# Patient Record
Sex: MALE | Race: WHITE | NOT HISPANIC OR LATINO | Employment: OTHER | URBAN - METROPOLITAN AREA
[De-identification: names, ages, dates, MRNs, and addresses within clinical notes are randomized per-mention and may not be internally consistent; named-entity substitution may affect disease eponyms.]

---

## 2021-10-15 ENCOUNTER — OFFICE VISIT (OUTPATIENT)
Dept: PODIATRY | Facility: CLINIC | Age: 81
End: 2021-10-15
Payer: MEDICARE

## 2021-10-15 VITALS
RESPIRATION RATE: 18 BRPM | SYSTOLIC BLOOD PRESSURE: 150 MMHG | HEIGHT: 70 IN | BODY MASS INDEX: 37.22 KG/M2 | DIASTOLIC BLOOD PRESSURE: 68 MMHG | HEART RATE: 63 BPM | WEIGHT: 260 LBS

## 2021-10-15 DIAGNOSIS — R60.0 EDEMA OF BOTH LOWER LEGS: ICD-10-CM

## 2021-10-15 DIAGNOSIS — E11.40 TYPE 2 DIABETES, CONTROLLED, WITH NEUROPATHY: Primary | ICD-10-CM

## 2021-10-15 DIAGNOSIS — L60.0 INCURVED TOENAIL: ICD-10-CM

## 2021-10-15 DIAGNOSIS — Z74.09 LIMITED MOBILITY: ICD-10-CM

## 2021-10-15 DIAGNOSIS — M19.90 ARTHRITIS: ICD-10-CM

## 2021-10-15 PROCEDURE — 99203 OFFICE O/P NEW LOW 30 MIN: CPT | Mod: S$PBB,,, | Performed by: PODIATRIST

## 2021-10-15 PROCEDURE — 99203 PR OFFICE/OUTPT VISIT, NEW, LEVL III, 30-44 MIN: ICD-10-PCS | Mod: S$PBB,,, | Performed by: PODIATRIST

## 2021-10-15 PROCEDURE — 99999 PR PBB SHADOW E&M-NEW PATIENT-LVL V: ICD-10-PCS | Mod: PBBFAC,,, | Performed by: PODIATRIST

## 2021-10-15 PROCEDURE — 99999 PR PBB SHADOW E&M-NEW PATIENT-LVL V: CPT | Mod: PBBFAC,,, | Performed by: PODIATRIST

## 2021-10-15 PROCEDURE — 99205 OFFICE O/P NEW HI 60 MIN: CPT | Mod: PBBFAC | Performed by: PODIATRIST

## 2021-10-15 RX ORDER — MULTIVITAMIN
1 TABLET ORAL DAILY
COMMUNITY
End: 2021-10-18

## 2021-10-15 RX ORDER — IPRATROPIUM BROMIDE 0.5 MG/2.5ML
500 SOLUTION RESPIRATORY (INHALATION) 4 TIMES DAILY
COMMUNITY

## 2021-10-15 RX ORDER — MELOXICAM 15 MG/1
15 TABLET ORAL DAILY
COMMUNITY
Start: 2021-07-16 | End: 2021-10-18

## 2021-10-15 RX ORDER — HYDROCODONE BITARTRATE AND ACETAMINOPHEN 7.5; 325 MG/1; MG/1
TABLET ORAL
COMMUNITY
Start: 2021-09-22 | End: 2021-10-18

## 2021-10-15 RX ORDER — SILODOSIN 8 MG/1
8 CAPSULE ORAL
COMMUNITY
Start: 2021-03-15 | End: 2022-07-31

## 2021-10-15 RX ORDER — SERTRALINE HYDROCHLORIDE 100 MG/1
100 TABLET, FILM COATED ORAL DAILY
COMMUNITY
Start: 2021-10-08 | End: 2021-10-18 | Stop reason: SDUPTHER

## 2021-10-15 RX ORDER — PROPRANOLOL HYDROCHLORIDE 80 MG/1
80 TABLET ORAL
COMMUNITY
End: 2021-10-18

## 2021-10-15 RX ORDER — CARBIDOPA AND LEVODOPA 25; 100 MG/1; MG/1
TABLET ORAL
COMMUNITY
Start: 2021-05-26 | End: 2022-05-21

## 2021-10-15 RX ORDER — TIOTROPIUM BROMIDE AND OLODATEROL 3.124; 2.736 UG/1; UG/1
SPRAY, METERED RESPIRATORY (INHALATION)
COMMUNITY
Start: 2021-09-12

## 2021-10-15 RX ORDER — AMLODIPINE BESYLATE 5 MG/1
TABLET ORAL
COMMUNITY
Start: 2021-07-20

## 2021-10-15 RX ORDER — SILODOSIN 8 MG/1
8 CAPSULE ORAL DAILY
COMMUNITY
Start: 2021-09-12 | End: 2021-10-18

## 2021-10-15 RX ORDER — GLIMEPIRIDE 2 MG/1
2 TABLET ORAL DAILY
COMMUNITY
Start: 2021-10-14 | End: 2021-10-18

## 2021-10-15 RX ORDER — LEVOFLOXACIN 500 MG/1
500 TABLET, FILM COATED ORAL DAILY
COMMUNITY
Start: 2021-09-27 | End: 2021-10-18

## 2021-10-15 RX ORDER — GABAPENTIN 600 MG/1
600 TABLET ORAL
COMMUNITY
Start: 2021-08-03 | End: 2024-02-21 | Stop reason: SDUPTHER

## 2021-10-15 RX ORDER — IPRATROPIUM BROMIDE 21 UG/1
SPRAY, METERED NASAL
COMMUNITY
Start: 2021-09-12 | End: 2021-10-18

## 2021-10-15 RX ORDER — SACUBITRIL AND VALSARTAN 97; 103 MG/1; MG/1
TABLET, FILM COATED ORAL
COMMUNITY
Start: 2021-02-08 | End: 2021-10-18

## 2021-10-15 RX ORDER — SERTRALINE HYDROCHLORIDE 100 MG/1
TABLET, FILM COATED ORAL
COMMUNITY
Start: 2021-04-18

## 2021-10-15 RX ORDER — METHOCARBAMOL 750 MG/1
750 TABLET, FILM COATED ORAL NIGHTLY
COMMUNITY
Start: 2021-09-22

## 2021-10-15 RX ORDER — BUTALBITAL, ACETAMINOPHEN AND CAFFEINE 50; 325; 40 MG/1; MG/1; MG/1
TABLET ORAL
COMMUNITY
Start: 2021-08-13 | End: 2021-10-18 | Stop reason: SDUPTHER

## 2021-10-15 RX ORDER — FLUOCINOLONE ACETONIDE 0.11 MG/ML
OIL AURICULAR (OTIC) 2 TIMES DAILY
COMMUNITY
Start: 2021-06-09 | End: 2021-10-18

## 2021-10-15 RX ORDER — IPRATROPIUM BROMIDE 21 UG/1
2 SPRAY, METERED NASAL
COMMUNITY
Start: 2020-09-17 | End: 2021-10-18

## 2021-10-15 RX ORDER — DOXYCYCLINE 100 MG/1
100 CAPSULE ORAL 2 TIMES DAILY
COMMUNITY
Start: 2021-08-18 | End: 2021-10-18

## 2021-10-15 RX ORDER — METOPROLOL SUCCINATE 50 MG/1
TABLET, EXTENDED RELEASE ORAL
COMMUNITY
Start: 2021-09-30 | End: 2023-02-06 | Stop reason: ALTCHOICE

## 2021-10-15 RX ORDER — TIOTROPIUM BROMIDE AND OLODATEROL 3.124; 2.736 UG/1; UG/1
2 SPRAY, METERED RESPIRATORY (INHALATION)
COMMUNITY
Start: 2021-03-25 | End: 2021-10-18 | Stop reason: SDUPTHER

## 2021-10-15 RX ORDER — METHOCARBAMOL 500 MG/1
TABLET, FILM COATED ORAL
COMMUNITY
Start: 2021-05-21 | End: 2021-10-18

## 2021-10-15 RX ORDER — FINASTERIDE 5 MG/1
5 TABLET, FILM COATED ORAL DAILY
COMMUNITY
Start: 2021-10-08 | End: 2021-10-18

## 2021-10-15 RX ORDER — OMEPRAZOLE 40 MG/1
CAPSULE, DELAYED RELEASE ORAL
COMMUNITY
Start: 2021-05-10 | End: 2021-10-18

## 2021-10-15 RX ORDER — CARBIDOPA AND LEVODOPA 25; 100 MG/1; MG/1
1 TABLET ORAL NIGHTLY
COMMUNITY
Start: 2021-08-23 | End: 2021-10-18 | Stop reason: SDUPTHER

## 2021-10-15 RX ORDER — ATORVASTATIN CALCIUM 40 MG/1
40 TABLET, FILM COATED ORAL
COMMUNITY
End: 2021-10-18

## 2021-10-15 RX ORDER — BUTALBITAL, ACETAMINOPHEN AND CAFFEINE 50; 325; 40 MG/1; MG/1; MG/1
TABLET ORAL
COMMUNITY
Start: 2021-08-13 | End: 2022-12-05 | Stop reason: SDUPTHER

## 2021-10-15 RX ORDER — METHOCARBAMOL 750 MG/1
750 TABLET, FILM COATED ORAL
COMMUNITY
Start: 2021-09-22 | End: 2021-10-18 | Stop reason: SDUPTHER

## 2021-10-15 RX ORDER — RANOLAZINE 1000 MG/1
TABLET, EXTENDED RELEASE ORAL
COMMUNITY
Start: 2021-08-13 | End: 2021-10-18

## 2021-10-15 RX ORDER — METFORMIN HYDROCHLORIDE 850 MG/1
TABLET ORAL
COMMUNITY
Start: 2021-05-10

## 2021-10-15 RX ORDER — HYDROCODONE BITARTRATE AND ACETAMINOPHEN 7.5; 325 MG/1; MG/1
1 TABLET ORAL 3 TIMES DAILY PRN
COMMUNITY
Start: 2021-09-22 | End: 2021-10-18

## 2021-10-15 RX ORDER — NITROFURANTOIN 25; 75 MG/1; MG/1
100 CAPSULE ORAL 2 TIMES DAILY
COMMUNITY
Start: 2021-07-19 | End: 2021-10-18

## 2021-10-15 RX ORDER — PREDNISONE 1 MG/1
TABLET ORAL
COMMUNITY
Start: 2021-06-09 | End: 2021-10-18

## 2022-01-19 ENCOUNTER — OFFICE VISIT (OUTPATIENT)
Dept: PODIATRY | Facility: CLINIC | Age: 82
End: 2022-01-19
Payer: MEDICARE

## 2022-01-19 VITALS
SYSTOLIC BLOOD PRESSURE: 147 MMHG | DIASTOLIC BLOOD PRESSURE: 67 MMHG | BODY MASS INDEX: 37.22 KG/M2 | HEART RATE: 60 BPM | OXYGEN SATURATION: 97 % | WEIGHT: 260 LBS | HEIGHT: 70 IN

## 2022-01-19 DIAGNOSIS — R60.0 EDEMA OF BOTH LOWER LEGS: ICD-10-CM

## 2022-01-19 DIAGNOSIS — E11.40 TYPE 2 DIABETES, CONTROLLED, WITH NEUROPATHY: ICD-10-CM

## 2022-01-19 DIAGNOSIS — L60.0 INCURVED TOENAIL: ICD-10-CM

## 2022-01-19 DIAGNOSIS — E11.9 COMPREHENSIVE DIABETIC FOOT EXAMINATION, TYPE 2 DM, ENCOUNTER FOR: Primary | ICD-10-CM

## 2022-01-19 DIAGNOSIS — M19.90 ARTHRITIS: ICD-10-CM

## 2022-01-19 DIAGNOSIS — Z74.09 LIMITED MOBILITY: ICD-10-CM

## 2022-01-19 PROCEDURE — 99215 OFFICE O/P EST HI 40 MIN: CPT | Mod: PBBFAC,PN | Performed by: PODIATRIST

## 2022-01-19 PROCEDURE — 99214 PR OFFICE/OUTPT VISIT, EST, LEVL IV, 30-39 MIN: ICD-10-PCS | Mod: S$PBB,,, | Performed by: PODIATRIST

## 2022-01-19 PROCEDURE — 99999 PR PBB SHADOW E&M-EST. PATIENT-LVL V: CPT | Mod: PBBFAC,,, | Performed by: PODIATRIST

## 2022-01-19 PROCEDURE — 99214 OFFICE O/P EST MOD 30 MIN: CPT | Mod: S$PBB,,, | Performed by: PODIATRIST

## 2022-01-19 PROCEDURE — 99999 PR PBB SHADOW E&M-EST. PATIENT-LVL V: ICD-10-PCS | Mod: PBBFAC,,, | Performed by: PODIATRIST

## 2022-01-19 RX ORDER — RANOLAZINE 1000 MG/1
1000 TABLET, EXTENDED RELEASE ORAL 2 TIMES DAILY
COMMUNITY
Start: 2021-11-03

## 2022-01-19 RX ORDER — SACUBITRIL AND VALSARTAN 97; 103 MG/1; MG/1
1 TABLET, FILM COATED ORAL 2 TIMES DAILY
COMMUNITY
Start: 2022-01-03

## 2022-01-19 RX ORDER — PREDNISONE 1 MG/1
3 TABLET ORAL
COMMUNITY
Start: 2021-11-04 | End: 2022-04-23

## 2022-01-19 RX ORDER — FLUTICASONE PROPIONATE 50 MCG
SPRAY, SUSPENSION (ML) NASAL
COMMUNITY
Start: 2021-12-08

## 2022-01-19 RX ORDER — HYDROCODONE BITARTRATE AND ACETAMINOPHEN 7.5; 325 MG/1; MG/1
1 TABLET ORAL 3 TIMES DAILY PRN
COMMUNITY
Start: 2021-12-31

## 2022-01-19 RX ORDER — GLIMEPIRIDE 2 MG/1
2 TABLET ORAL DAILY
COMMUNITY
Start: 2022-01-13

## 2022-01-19 RX ORDER — OMEPRAZOLE 40 MG/1
40 CAPSULE, DELAYED RELEASE ORAL 2 TIMES DAILY
COMMUNITY
Start: 2021-12-07

## 2022-01-19 RX ORDER — FINASTERIDE 5 MG/1
5 TABLET, FILM COATED ORAL DAILY
COMMUNITY
Start: 2021-12-28

## 2022-01-19 RX ORDER — IPRATROPIUM BROMIDE 21 UG/1
SPRAY, METERED NASAL
COMMUNITY
Start: 2021-12-08

## 2022-01-22 NOTE — PROGRESS NOTES
Subjective:       Patient ID: Angel Duque Jr. is a 81 y.o. male.    Chief Complaint: Diabetic Foot Exam  Patient presents for annual diabetic foot exam chronic lower extremity edema, diminished pedal pulses. States nails feel ingrown. No swelling redness. Patient states he did eventually have arterial ultrasound performed through [facility] and when over results with his PCP which were consistent with age, no blockages.  He does confirm its chronic swelling in legs most likely does not help his circulation, does wear compression hose  Relates 10 year history diabetes,  well controlled, A1c 6.2, glucose 115 yesterday.  History of neuropathy on gabapentin.  Also has rheumatoid arthritis.        Past Medical History:   Diagnosis Date    AAA (abdominal aortic aneurysm)     Arthritis     Rheumatoid & Osteoarthritis    BPH (benign prostatic hyperplasia)     CAD (coronary artery disease)     Chronic bronchitis     COPD (chronic obstructive pulmonary disease)     Depression     Diabetes mellitus, type 2     Diverticulosis     DM (diabetes mellitus)     Dyslipidemia     GERD (gastroesophageal reflux disease)     Gout     Hernia of unspecified site of abdominal cavity without mention of obstruction or gangrene     HTN (hypertension)     Hx of CABG     Hyperlipidemia     Hypothyroidism     Insomnia     Ischemic cardiomyopathy     Lower back pain     Lymphedema     Macular degeneration     MI (myocardial infarction)     HORTENSIA (obstructive sleep apnea)     Peripheral neuropathy     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     Tremor      Past Surgical History:   Procedure Laterality Date    ABDOMINAL AORTIC ANEURYSM REPAIR      ABDOMINAL HERNIA REPAIR      CARPAL TUNNEL RELEASE Right     CORONARY ARTERY BYPASS GRAFT      hemrhoidectomy      HERNIA REPAIR      NASAL SEPTUM SURGERY      TONSILLECTOMY      TOTAL KNEE ARTHROPLASTY      TOTAL KNEE ARTHROPLASTY Bilateral      Family  History   Problem Relation Age of Onset    Esophageal cancer Paternal Uncle     Cancer Paternal Uncle 69        esophageal    Esophageal cancer Paternal Grandfather     Cancer Paternal Grandfather     Colon cancer Neg Hx      Social History     Socioeconomic History    Marital status:    Tobacco Use    Smoking status: Former Smoker     Quit date: 1997     Years since quittin.0    Smokeless tobacco: Never Used   Substance and Sexual Activity    Alcohol use: No    Drug use: No       Current Outpatient Medications   Medication Sig Dispense Refill    allopurinol (ZYLOPRIM) 300 MG tablet Take 300 mg by mouth once daily.       amLODIPine (NORVASC) 5 MG tablet       atorvastatin (LIPITOR) 80 MG tablet Take 80 mg by mouth every evening.       butalbital-acetaminophen-caffeine -40 mg (FIORICET, ESGIC) -40 mg per tablet   2 tab, Oral, q6h, PRN for headache, # 120 tab, 0 Refill(s), [facility]acy: [id number], 179, cm, 21 10:28:00 CDT, Height/Length Measured, 121.6, kg, 21 10:28:00 CDT, Weight Dosing      carbidopa-levodopa  mg (SINEMET)  mg per tablet   1 tab, Oral, HS, # 90 tab, 3 Refill(s), Pharmacy: [id number], 179, cm, 21 14:33:00 CDT, Height/Length Measured, 118.3, kg, 21 14:33:00 CDT, Weight Dosing      DICLOFENAC SODIUM (VOLTAREN TOP) Apply 1 % topically as needed (for pain in knees).      ENTRESTO  mg per tablet Take 1 tablet by mouth 2 (two) times daily.      finasteride (PROSCAR) 5 mg tablet Take 5 mg by mouth once daily.      fluticasone propionate (FLONASE) 50 mcg/actuation nasal spray by Each Nostril route.      gabapentin (NEURONTIN) 600 MG tablet 600 mg.      glimepiride (AMARYL) 2 MG tablet Take 2 mg by mouth once daily.      HYDROcodone-acetaminophen (NORCO) 7.5-325 mg per tablet Take 1 tablet by mouth 3 (three) times daily as needed.      ipratropium (ATROVENT) 0.02 % nebulizer solution Take 500 mcg by  nebulization 4 (four) times daily. Rescue      ipratropium (ATROVENT) 21 mcg (0.03 %) nasal spray SMARTSIG:Both Nares      levothyroxine (SYNTHROID) 88 MCG tablet Take 88 mcg by mouth before breakfast.       metFORMIN (GLUCOPHAGE) 850 MG tablet   = 1 tab, Oral, BID, *ZYDUS*., # 180 tab, 3 Refill(s), Maintenance, Pharmacy: [facility] 179, cm, 21 12:19:00 CDT, Height/Length Measured, 117.8, kg, 21 12:19:00 CDT, Weight Dosing      methocarbamoL (ROBAXIN) 750 MG Tab Take 750 mg by mouth nightly.      metoprolol succinate (TOPROL-XL) 50 MG 24 hr tablet       omeprazole (PRILOSEC) 40 MG capsule Take 40 mg by mouth 2 (two) times daily.      predniSONE (DELTASONE) 1 MG tablet 3 mg.      ranolazine (RANEXA) 1,000 mg Tb12 Take 1,000 mg by mouth 2 (two) times daily.      sertraline (ZOLOFT) 100 MG tablet   = 1 tab, Oral, Daily, # 90 tab, 3 Refill(s), Maintenance, Pharmacy: [facility], 179, cm, 21 14:52:00 CDT, Height/Length Measured, 117.02, kg, 21 14:52:00 CDT, Weight Dosing      silodosin (RAPAFLO) 8 mg Cap capsule 8 mg.      STIOLTO RESPIMAT 2.5-2.5 mcg/actuation Mist SMARTSI Puff(s) Via Inhaler Daily      trazodone (DESYREL) 150 MG tablet Take 150 mg by mouth every evening.       No current facility-administered medications for this visit.     Review of patient's allergies indicates:   Allergen Reactions    Pregabalin      Other reaction(s): Depression    Flomax [tamsulosin]      Made extremity weak    Roflumilast     Sulfa (sulfonamide antibiotics)     Sulfa dyne     Sulfur      Old type not new types of sulfur    Latex Itching    Tegretol [carbamazepine] Itching and Rash       Review of Systems   HENT: Negative for congestion.    Respiratory: Negative for cough and shortness of breath.    Cardiovascular: Positive for leg swelling.   Musculoskeletal: Positive for gait problem.   All other systems reviewed and are negative.     "  Objective:      Vitals:    01/19/22 1125   BP: (!) 147/67   Pulse: 60   SpO2: 97%   Weight: 117.9 kg (260 lb)   Height: 5' 10" (1.778 m)     Physical Exam  Vitals and nursing note reviewed.   Constitutional:       General: He is not in acute distress.  Cardiovascular:      Pulses:           Dorsalis pedis pulses are 1+ on the right side and 1+ on the left side.        Posterior tibial pulses are 1+ on the right side and 0 on the left side.   Pulmonary:      Effort: Pulmonary effort is normal.   Musculoskeletal:      Right foot: Decreased range of motion (Arthritic and degenerative changes bilateral feet limited range of motion 1st MPJ and midfoot bilateral).      Left foot: Decreased range of motion.   Feet:      Right foot:      Protective Sensation: 6 sites tested. 6 sites sensed.      Skin integrity: No skin breakdown or erythema.      Toenail Condition: Right toenails are long.      Left foot:      Protective Sensation: 6 sites tested. 6 sites sensed.      Skin integrity: No skin breakdown or erythema (incurvated nails, no erythema b/l hallux).      Toenail Condition: Left toenails are long.   Skin:     Capillary Refill: Capillary refill takes 2 to 3 seconds.   Neurological:      General: No focal deficit present.      Comments: Sensation intact all areas bilateral feet with monofilament, no painfil paresthesias feet   Psychiatric:         Mood and Affect: Mood normal.         Behavior: Behavior normal.           Assessment:       1. Comprehensive diabetic foot examination, type 2 DM, encounter for    2. Type 2 diabetes, controlled, with neuropathy    3. Edema of both lower legs    4. Arthritis    5. Limited mobility    6. Incurved toenail        Plan:         Diabetic pedal exam performed.    Reviewed results monofilament testing, sensation intact all areas bilateral feet which is excellent and consistent with a history of well-controlled diabetes.  We did discussed painful symptoms of neuropathy which can be " diabetic and arthritis related  Reviewed diabetic education   Discussed benefit of continued tight control of glucose/diabetes   Open-toed compression hose were left intact today    Discussed maintenance of skin and nails and potential complications.    Reassured there is no evidence of ingrown nail, borders are slightly incurvated, advised again there is most likely pressure due to swelling and compression hose. Nails debrided.  No complications bilateral feet  Reviewed arthritis, shoes, limited mobility, stability  Reviewed appropriate shoes to accommodate swelling, especially indoors to protect feet, no flat shoes, slippers or walking in sock or bare feet.      Reviewed need for daily foot checks and instructed patient to contact the office with any area of redness or swelling which has not improved within 3 days.  Patient was in understanding and agreement with treatment plan.  I counseled the patient on their conditions, implications and medical management.  Instructed patient to contact the office with any changes, questions, concerns, worsening of symptoms.   Total face-to-face time, exam, assessment, treatment, discussion, documentation 30 minutes, more than half this time spent on consultation and coordination of care.  Follow up as needed, 3 months    This note was created using M*Modal voice recognition software that occasionally misinterpreted phrases or words.

## 2022-04-20 ENCOUNTER — OFFICE VISIT (OUTPATIENT)
Dept: PODIATRY | Facility: CLINIC | Age: 82
End: 2022-04-20
Payer: MEDICARE

## 2022-04-20 VITALS
WEIGHT: 250 LBS | BODY MASS INDEX: 35.79 KG/M2 | HEIGHT: 70 IN | HEART RATE: 65 BPM | OXYGEN SATURATION: 98 % | DIASTOLIC BLOOD PRESSURE: 66 MMHG | SYSTOLIC BLOOD PRESSURE: 137 MMHG

## 2022-04-20 DIAGNOSIS — Z74.09 LIMITED MOBILITY: ICD-10-CM

## 2022-04-20 DIAGNOSIS — M19.90 ARTHRITIS: ICD-10-CM

## 2022-04-20 DIAGNOSIS — R60.0 EDEMA OF BOTH LOWER LEGS: ICD-10-CM

## 2022-04-20 DIAGNOSIS — E11.40 TYPE 2 DIABETES, CONTROLLED, WITH NEUROPATHY: Primary | ICD-10-CM

## 2022-04-20 PROCEDURE — 99214 OFFICE O/P EST MOD 30 MIN: CPT | Mod: PBBFAC,PN | Performed by: PODIATRIST

## 2022-04-20 PROCEDURE — 99213 OFFICE O/P EST LOW 20 MIN: CPT | Mod: S$PBB,,, | Performed by: PODIATRIST

## 2022-04-20 PROCEDURE — 99999 PR PBB SHADOW E&M-EST. PATIENT-LVL IV: ICD-10-PCS | Mod: PBBFAC,,, | Performed by: PODIATRIST

## 2022-04-20 PROCEDURE — 99213 PR OFFICE/OUTPT VISIT, EST, LEVL III, 20-29 MIN: ICD-10-PCS | Mod: S$PBB,,, | Performed by: PODIATRIST

## 2022-04-20 PROCEDURE — 99999 PR PBB SHADOW E&M-EST. PATIENT-LVL IV: CPT | Mod: PBBFAC,,, | Performed by: PODIATRIST

## 2022-04-23 NOTE — PROGRESS NOTES
Subjective:       Patient ID: Angel Duque Jr. is a 81 y.o. male.    Chief Complaint: Diabetic Foot Exam  Patient presents  For follow-up due to type 2 diabetes, lower extremity edema, arthritis/ RA, ingrown nails.    Confirms compliance with open toed compression socks daily.  Is having pain due to ingrown nails, no redness or swelling  Taking gabapentin for neuropathy  Has a 10 year history of well-controlled type 2 diabetes.  Reports glucose was 117 this morning          Past Medical History:   Diagnosis Date    AAA (abdominal aortic aneurysm)     Arthritis     Rheumatoid & Osteoarthritis    BPH (benign prostatic hyperplasia)     CAD (coronary artery disease)     Chronic bronchitis     COPD (chronic obstructive pulmonary disease)     Depression     Diabetes mellitus, type 2     Diverticulosis     DM (diabetes mellitus)     Dyslipidemia     GERD (gastroesophageal reflux disease)     Gout     Hernia of unspecified site of abdominal cavity without mention of obstruction or gangrene     HTN (hypertension)     Hx of CABG     Hyperlipidemia     Hypothyroidism     Insomnia     Ischemic cardiomyopathy     Lower back pain     Lymphedema     Macular degeneration     MI (myocardial infarction)     HORTENSIA (obstructive sleep apnea)     Peripheral neuropathy     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     Tremor      Past Surgical History:   Procedure Laterality Date    ABDOMINAL AORTIC ANEURYSM REPAIR      ABDOMINAL HERNIA REPAIR      CARPAL TUNNEL RELEASE Right     CORONARY ARTERY BYPASS GRAFT      hemrhoidectomy      HERNIA REPAIR      NASAL SEPTUM SURGERY      TONSILLECTOMY      TOTAL KNEE ARTHROPLASTY      TOTAL KNEE ARTHROPLASTY Bilateral      Family History   Problem Relation Age of Onset    Esophageal cancer Paternal Uncle     Cancer Paternal Uncle 69        esophageal    Esophageal cancer Paternal Grandfather     Cancer Paternal Grandfather     Colon cancer Neg  Hx      Social History     Socioeconomic History    Marital status:    Tobacco Use    Smoking status: Former Smoker     Quit date: 1997     Years since quittin.2    Smokeless tobacco: Never Used   Substance and Sexual Activity    Alcohol use: No    Drug use: No       Current Outpatient Medications   Medication Sig Dispense Refill    allopurinol (ZYLOPRIM) 300 MG tablet Take 300 mg by mouth once daily.       amLODIPine (NORVASC) 5 MG tablet       atorvastatin (LIPITOR) 80 MG tablet Take 80 mg by mouth every evening.       butalbital-acetaminophen-caffeine -40 mg (FIORICET, ESGIC) -40 mg per tablet   2 tab, Oral, q6h, PRN for headache, # 120 tab, 0 Refill(s), Pharmacy: [id number], 179, cm, 21 10:28:00 CDT, Height/Length Measured, 121.6, kg, 21 10:28:00 CDT, Weight Dosing      carbidopa-levodopa  mg (SINEMET)  mg per tablet   1 tab, Oral, HS, # 90 tab, 3 Refill(s), Pharmacy: [id number], 179, cm, 21 14:33:00 CDT, Height/Length Measured, 118.3, kg, 21 14:33:00 CDT, Weight Dosing      DICLOFENAC SODIUM (VOLTAREN TOP) Apply 1 % topically as needed (for pain in knees).      ENTRESTO  mg per tablet Take 1 tablet by mouth 2 (two) times daily.      finasteride (PROSCAR) 5 mg tablet Take 5 mg by mouth once daily.      fluticasone propionate (FLONASE) 50 mcg/actuation nasal spray by Each Nostril route.      gabapentin (NEURONTIN) 600 MG tablet 600 mg.      glimepiride (AMARYL) 2 MG tablet Take 2 mg by mouth once daily.      HYDROcodone-acetaminophen (NORCO) 7.5-325 mg per tablet Take 1 tablet by mouth 3 (three) times daily as needed.      ipratropium (ATROVENT) 0.02 % nebulizer solution Take 500 mcg by nebulization 4 (four) times daily. Rescue      ipratropium (ATROVENT) 21 mcg (0.03 %) nasal spray SMARTSIG:Both Nares      levothyroxine (SYNTHROID) 88 MCG tablet Take 88 mcg by mouth before breakfast.       metFORMIN  "(GLUCOPHAGE) 850 MG tablet   = 1 tab, Oral, BID, *ZYDUS*., # 180 tab, 3 Refill(s), Maintenance, Pharmacy: [facility], 179, cm, 21 12:19:00 CDT, Height/Length Measured, 117.8, kg, 21 12:19:00 CDT, Weight Dosing      methocarbamoL (ROBAXIN) 750 MG Tab Take 750 mg by mouth nightly.      metoprolol succinate (TOPROL-XL) 50 MG 24 hr tablet       omeprazole (PRILOSEC) 40 MG capsule Take 40 mg by mouth 2 (two) times daily.      ranolazine (RANEXA) 1,000 mg Tb12 Take 1,000 mg by mouth 2 (two) times daily.      sertraline (ZOLOFT) 100 MG tablet   = 1 tab, Oral, Daily, # 90 tab, 3 Refill(s), Maintenance, Pharmacy: [facility], 179, cm, 21 14:52:00 CDT, Height/Length Measured, 117.02, kg, 21 14:52:00 CDT, Weight Dosing      STIOLTO RESPIMAT 2.5-2.5 mcg/actuation Mist SMARTSI Puff(s) Via Inhaler Daily      trazodone (DESYREL) 150 MG tablet Take 150 mg by mouth every evening.      predniSONE (DELTASONE) 1 MG tablet 3 mg.      silodosin (RAPAFLO) 8 mg Cap capsule 8 mg.       No current facility-administered medications for this visit.     Review of patient's allergies indicates:   Allergen Reactions    Pregabalin      Other reaction(s): Depression    Flomax [tamsulosin]      Made extremity weak    Roflumilast     Sulfa (sulfonamide antibiotics)     Sulfa dyne     Sulfur      Old type not new types of sulfur    Latex Itching    Tegretol [carbamazepine] Itching and Rash       Review of Systems   HENT: Negative for congestion.    Respiratory: Negative for cough and shortness of breath.    Cardiovascular: Positive for leg swelling.   Musculoskeletal: Positive for gait problem.   All other systems reviewed and are negative.      Objective:      Vitals:    22 1151   BP: 137/66   Pulse: 65   SpO2: 98%   Weight: 113.4 kg (250 lb)   Height: 5' 10" (1.778 m)     Physical Exam  Vitals and nursing note reviewed.   Constitutional:       General: He is not " in acute distress.  Cardiovascular:      Pulses:           Dorsalis pedis pulses are 1+ on the right side and 1+ on the left side.        Posterior tibial pulses are 1+ on the right side and 0 on the left side.   Pulmonary:      Effort: Pulmonary effort is normal.   Musculoskeletal:      Right foot: Decreased range of motion (Arthritic and degenerative changes bilateral feet limited range of motion 1st MPJ and midfoot bilateral). Bunion present.      Left foot: Decreased range of motion. Bunion present.   Feet:      Right foot:      Skin integrity: No skin breakdown or erythema.      Toenail Condition: Right toenails are long.      Left foot:      Skin integrity: No skin breakdown or erythema (incurvated nails, no erythema b/l hallux).      Toenail Condition: Left toenails are long.   Skin:     Capillary Refill: Capillary refill takes 2 to 3 seconds.   Neurological:      General: No focal deficit present.   Psychiatric:         Mood and Affect: Mood normal.         Behavior: Behavior normal.           Assessment:       1. Type 2 diabetes, controlled, with neuropathy    2. Arthritis    3. Edema of both lower legs    4. Limited mobility        Plan:         Reviewed diabetic education   Reviewed benefit of continued tight control of glucose/diabetes   Reviewed importance to control swelling and potential complications  Open-toed compression hose were left intact today    Discussed maintenance of skin and nails and potential complications.    Reassured there is no evidence of ingrown nail, borders incurvated  And explained nails are much improved, continue any topical medication which is effective.  Reviewed Vicks vapor rub or tea tree oil.  Advised patient these nails can cause increased pain due to neuropathy.  Nails debrided.  No complications bilateral feet  Reviewed arthritis, appropriate shoes to accommodate swelling, especially indoors to protect feet      Reviewed need for daily foot checks and instructed  patient to contact the office with any area of redness or swelling which has not improved within 3 days.  Patient was in understanding and agreement with treatment plan.  I counseled the patient on their conditions, implications and medical management.  Instructed patient to contact the office with any changes, questions, concerns, worsening of symptoms.   Total face-to-face time, exam, assessment, treatment, discussion, documentation 20 minutes, more than half this time spent on consultation and coordination of care.  Follow up as needed, 3 months    This note was created using M*Modal voice recognition software that occasionally misinterpreted phrases or words.

## 2022-07-27 ENCOUNTER — OFFICE VISIT (OUTPATIENT)
Dept: PODIATRY | Facility: CLINIC | Age: 82
End: 2022-07-27
Payer: MEDICARE

## 2022-07-27 VITALS
SYSTOLIC BLOOD PRESSURE: 153 MMHG | HEART RATE: 90 BPM | HEIGHT: 70 IN | OXYGEN SATURATION: 95 % | WEIGHT: 250 LBS | DIASTOLIC BLOOD PRESSURE: 74 MMHG | BODY MASS INDEX: 35.79 KG/M2

## 2022-07-27 DIAGNOSIS — Z74.09 LIMITED MOBILITY: ICD-10-CM

## 2022-07-27 DIAGNOSIS — M19.90 ARTHRITIS: Primary | ICD-10-CM

## 2022-07-27 DIAGNOSIS — R60.0 EDEMA OF BOTH LOWER LEGS: ICD-10-CM

## 2022-07-27 DIAGNOSIS — E11.40 TYPE 2 DIABETES, CONTROLLED, WITH NEUROPATHY: ICD-10-CM

## 2022-07-27 DIAGNOSIS — L60.0 INCURVED TOENAIL: ICD-10-CM

## 2022-07-27 PROCEDURE — 99215 OFFICE O/P EST HI 40 MIN: CPT | Mod: PBBFAC,PN | Performed by: PODIATRIST

## 2022-07-27 PROCEDURE — 99213 PR OFFICE/OUTPT VISIT, EST, LEVL III, 20-29 MIN: ICD-10-PCS | Mod: S$PBB,,, | Performed by: PODIATRIST

## 2022-07-27 PROCEDURE — 99213 OFFICE O/P EST LOW 20 MIN: CPT | Mod: S$PBB,,, | Performed by: PODIATRIST

## 2022-07-27 PROCEDURE — 99999 PR PBB SHADOW E&M-EST. PATIENT-LVL V: ICD-10-PCS | Mod: PBBFAC,,, | Performed by: PODIATRIST

## 2022-07-27 PROCEDURE — 99999 PR PBB SHADOW E&M-EST. PATIENT-LVL V: CPT | Mod: PBBFAC,,, | Performed by: PODIATRIST

## 2022-07-31 NOTE — PROGRESS NOTES
Subjective:       Patient ID: Angel Duque Jr. is a 81 y.o. male.    Chief Complaint: Follow-up and Diabetes Mellitus  Patient presents today for follow-up due to type 2 diabetes, lower extremity edema, arthritis/ RA, ingrown nails.  Relates no significant changes in health.  Relates usual pain, burning tingling in hands and feet. Toenail hurt, feel ingrown,  Wears open toed compression socks daily.  Taking gabapentin for neuropathy  Has a 10 year history of well-controlled type 2 diabetes.  Reports glucose was 112 this morning          Past Medical History:   Diagnosis Date    AAA (abdominal aortic aneurysm)     Arthritis     Rheumatoid & Osteoarthritis    BPH (benign prostatic hyperplasia)     CAD (coronary artery disease)     Chronic bronchitis     COPD (chronic obstructive pulmonary disease)     Depression     Diabetes mellitus, type 2     Diverticulosis     DM (diabetes mellitus)     Dyslipidemia     GERD (gastroesophageal reflux disease)     Gout     Hernia of unspecified site of abdominal cavity without mention of obstruction or gangrene     HTN (hypertension)     Hx of CABG     Hyperlipidemia     Hypothyroidism     Insomnia     Ischemic cardiomyopathy     Lower back pain     Lymphedema     Macular degeneration     MI (myocardial infarction)     HORTENSIA (obstructive sleep apnea)     Peripheral neuropathy     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     Tremor      Past Surgical History:   Procedure Laterality Date    ABDOMINAL AORTIC ANEURYSM REPAIR      ABDOMINAL HERNIA REPAIR      CARPAL TUNNEL RELEASE Right     CORONARY ARTERY BYPASS GRAFT      hemrhoidectomy      HERNIA REPAIR      NASAL SEPTUM SURGERY      TONSILLECTOMY      TOTAL KNEE ARTHROPLASTY      TOTAL KNEE ARTHROPLASTY Bilateral      Family History   Problem Relation Age of Onset    Esophageal cancer Paternal Uncle     Cancer Paternal Uncle 69        esophageal    Esophageal cancer Paternal  Grandfather     Cancer Paternal Grandfather     Colon cancer Neg Hx      Social History     Socioeconomic History    Marital status:    Tobacco Use    Smoking status: Former Smoker     Quit date: 1997     Years since quittin.5    Smokeless tobacco: Never Used   Substance and Sexual Activity    Alcohol use: No    Drug use: No       Current Outpatient Medications   Medication Sig Dispense Refill    allopurinol (ZYLOPRIM) 300 MG tablet Take 300 mg by mouth once daily.       amLODIPine (NORVASC) 5 MG tablet       atorvastatin (LIPITOR) 80 MG tablet Take 80 mg by mouth every evening.       butalbital-acetaminophen-caffeine -40 mg (FIORICET, ESGIC) -40 mg per tablet   2 tab, Oral, q6h, PRN for headache, # 120 tab, 0 Refill(s), Pharmacy: [id number], 179, cm, 21 10:28:00 CDT, Height/Length Measured, 121.6, kg, 21 10:28:00 CDT, Weight Dosing      DICLOFENAC SODIUM (VOLTAREN TOP) Apply 1 % topically as needed (for pain in knees).      ENTRESTO  mg per tablet Take 1 tablet by mouth 2 (two) times daily.      finasteride (PROSCAR) 5 mg tablet Take 5 mg by mouth once daily.      fluticasone propionate (FLONASE) 50 mcg/actuation nasal spray by Each Nostril route.      gabapentin (NEURONTIN) 600 MG tablet 600 mg.      glimepiride (AMARYL) 2 MG tablet Take 2 mg by mouth once daily.      HYDROcodone-acetaminophen (NORCO) 7.5-325 mg per tablet Take 1 tablet by mouth 3 (three) times daily as needed.      ipratropium (ATROVENT) 0.02 % nebulizer solution Take 500 mcg by nebulization 4 (four) times daily. Rescue      ipratropium (ATROVENT) 21 mcg (0.03 %) nasal spray SMARTSIG:Both Nares      levothyroxine (SYNTHROID) 88 MCG tablet Take 88 mcg by mouth before breakfast.       metFORMIN (GLUCOPHAGE) 850 MG tablet   = 1 tab, Oral, BID, *ZYDUS*., # 180 tab, 3 Refill(s), Maintenance, Pharmacy: [facility], 179, cm, 21 12:19:00 CDT, Height/Length  "Measured, 117.8, kg, 21 12:19:00 CDT, Weight Dosing      methocarbamoL (ROBAXIN) 750 MG Tab Take 750 mg by mouth nightly.      metoprolol succinate (TOPROL-XL) 50 MG 24 hr tablet       omeprazole (PRILOSEC) 40 MG capsule Take 40 mg by mouth 2 (two) times daily.      ranolazine (RANEXA) 1,000 mg Tb12 Take 1,000 mg by mouth 2 (two) times daily.      sertraline (ZOLOFT) 100 MG tablet   = 1 tab, Oral, Daily, # 90 tab, 3 Refill(s), Maintenance, Pharmacy: [facility], 179, cm, 21 14:52:00 CDT, Height/Length Measured, 117.02, kg, 21 14:52:00 CDT, Weight Dosing      STIOLTO RESPIMAT 2.5-2.5 mcg/actuation Mist SMARTSI Puff(s) Via Inhaler Daily      trazodone (DESYREL) 150 MG tablet Take 150 mg by mouth every evening.      carbidopa-levodopa  mg (SINEMET)  mg per tablet   1 tab, Oral, HS, # 90 tab, 3 Refill(s), Pharmacy: [id number], 179, cm, 21 14:33:00 CDT, Height/Length Measured, 118.3, kg, 21 14:33:00 CDT, Weight Dosing       No current facility-administered medications for this visit.     Review of patient's allergies indicates:   Allergen Reactions    Pregabalin      Other reaction(s): Depression    Flomax [tamsulosin]      Made extremity weak    Roflumilast     Sulfa (sulfonamide antibiotics)     Sulfa dyne     Sulfur      Old type not new types of sulfur    Latex Itching    Tegretol [carbamazepine] Itching and Rash       Review of Systems   HENT: Negative for congestion.    Respiratory: Negative for cough and shortness of breath.    Cardiovascular: Positive for leg swelling.        Compression hose   Endocrine:        >10 yr DM2   Musculoskeletal: Positive for gait problem.        Walker, limited mobility    All other systems reviewed and are negative.      Objective:      Vitals:    22 1049   BP: (!) 153/74   Pulse: 90   SpO2: 95%   Weight: 113.4 kg (250 lb)   Height: 5' 10" (1.778 m)     Physical Exam  Vitals and nursing note " reviewed.   Constitutional:       General: He is not in acute distress.  Cardiovascular:      Pulses:           Dorsalis pedis pulses are 1+ on the right side and 1+ on the left side.        Posterior tibial pulses are 1+ on the right side and 1+ on the left side.   Pulmonary:      Effort: Pulmonary effort is normal.   Musculoskeletal:      Right foot: Decreased range of motion (Arthritic and degenerative changes bilateral feet limited range of motion 1st MPJ and midfoot bilateral). Bunion present.      Left foot: Decreased range of motion. No bunion.   Feet:      Right foot:      Skin integrity: No skin breakdown or erythema.      Toenail Condition: Right toenails are long.      Left foot:      Skin integrity: No skin breakdown or erythema (incurvated nails, no erythema b/l hallux).      Toenail Condition: Left toenails are long.   Skin:     Capillary Refill: Capillary refill takes 2 to 3 seconds.   Neurological:      General: No focal deficit present.   Psychiatric:         Mood and Affect: Mood normal.         Behavior: Behavior normal.           Assessment:       1. Arthritis    2. Edema of both lower legs    3. Type 2 diabetes, controlled, with neuropathy    4. Incurved toenail    5. Limited mobility        Plan:         Reviewed diabetic education   Reviewed with patient nails will cause increased pain due to neuropathy, reviewed treatments  Reviewed swelling and potential complications, well maintained  Open-toed compression hose were left intact today    Discussed maintenance of skin, nails, ingrown nails and potential complications.    Reassured there is no evidence of ingrown nail, all 4 borders are incurvated. Border debrided, no bleeding or drainage, no sign of infection  Reviewed Vicks vapor rub daily    Nails debrided.  No complications bilateral feet  Reviewed arthritis, appropriate shoes to accommodate swelling, especially indoors to protect feet      Reviewed need for daily foot checks and  instructed patient to contact the office with any area of redness or swelling which has not improved within 3 days.  Patient was in understanding and agreement with treatment plan.  I counseled the patient on their conditions, implications and medical management.  Instructed patient to contact the office with any changes, questions, concerns, worsening of symptoms.   Total face-to-face time, exam, assessment, treatment, discussion, documentation 20 minutes, more than half this time spent on consultation and coordination of care.  Follow up as needed, 3 months    This note was created using M*Modal voice recognition software that occasionally misinterpreted phrases or words.

## 2022-11-02 ENCOUNTER — OFFICE VISIT (OUTPATIENT)
Dept: PODIATRY | Facility: CLINIC | Age: 82
End: 2022-11-02
Payer: MEDICARE

## 2022-11-02 VITALS
BODY MASS INDEX: 36.83 KG/M2 | DIASTOLIC BLOOD PRESSURE: 65 MMHG | HEART RATE: 56 BPM | HEIGHT: 70 IN | RESPIRATION RATE: 16 BRPM | WEIGHT: 257.25 LBS | SYSTOLIC BLOOD PRESSURE: 136 MMHG

## 2022-11-02 DIAGNOSIS — Z74.09 LIMITED MOBILITY: ICD-10-CM

## 2022-11-02 DIAGNOSIS — E11.40 TYPE 2 DIABETES, CONTROLLED, WITH NEUROPATHY: ICD-10-CM

## 2022-11-02 DIAGNOSIS — M19.90 ARTHRITIS: ICD-10-CM

## 2022-11-02 DIAGNOSIS — L60.0 INCURVED TOENAIL: ICD-10-CM

## 2022-11-02 DIAGNOSIS — I83.019 VENOUS STASIS ULCER OF RIGHT LOWER LEG WITH EDEMA OF RIGHT LOWER LEG: Primary | ICD-10-CM

## 2022-11-02 DIAGNOSIS — R60.0 VENOUS STASIS ULCER OF RIGHT LOWER LEG WITH EDEMA OF RIGHT LOWER LEG: Primary | ICD-10-CM

## 2022-11-02 DIAGNOSIS — I83.891 VENOUS STASIS ULCER OF RIGHT LOWER LEG WITH EDEMA OF RIGHT LOWER LEG: Primary | ICD-10-CM

## 2022-11-02 DIAGNOSIS — R60.0 EDEMA OF BOTH LOWER LEGS: ICD-10-CM

## 2022-11-02 DIAGNOSIS — L97.919 VENOUS STASIS ULCER OF RIGHT LOWER LEG WITH EDEMA OF RIGHT LOWER LEG: Primary | ICD-10-CM

## 2022-11-02 PROCEDURE — 99214 PR OFFICE/OUTPT VISIT, EST, LEVL IV, 30-39 MIN: ICD-10-PCS | Mod: S$PBB,,, | Performed by: PODIATRIST

## 2022-11-02 PROCEDURE — 99215 OFFICE O/P EST HI 40 MIN: CPT | Mod: PBBFAC,PN | Performed by: PODIATRIST

## 2022-11-02 PROCEDURE — 99999 PR PBB SHADOW E&M-EST. PATIENT-LVL V: CPT | Mod: PBBFAC,,, | Performed by: PODIATRIST

## 2022-11-02 PROCEDURE — 87186 SC STD MICRODIL/AGAR DIL: CPT | Performed by: PODIATRIST

## 2022-11-02 PROCEDURE — 87070 CULTURE OTHR SPECIMN AEROBIC: CPT | Performed by: PODIATRIST

## 2022-11-02 PROCEDURE — 99214 OFFICE O/P EST MOD 30 MIN: CPT | Mod: S$PBB,,, | Performed by: PODIATRIST

## 2022-11-02 PROCEDURE — 87077 CULTURE AEROBIC IDENTIFY: CPT | Performed by: PODIATRIST

## 2022-11-02 PROCEDURE — 99999 PR PBB SHADOW E&M-EST. PATIENT-LVL V: ICD-10-PCS | Mod: PBBFAC,,, | Performed by: PODIATRIST

## 2022-11-02 RX ORDER — RANIBIZUMAB 6 MG/ML
INJECTION, SOLUTION INTRAVITREAL
COMMUNITY

## 2022-11-02 RX ORDER — VITAMIN E 268 MG
CAPSULE ORAL
COMMUNITY

## 2022-11-02 RX ORDER — LANOLIN ALCOHOL/MO/W.PET/CERES
CREAM (GRAM) TOPICAL
COMMUNITY
End: 2023-02-06

## 2022-11-02 RX ORDER — AMOXICILLIN 250 MG
CAPSULE ORAL
COMMUNITY
End: 2023-08-08

## 2022-11-02 RX ORDER — AMOXICILLIN AND CLAVULANATE POTASSIUM 875; 125 MG/1; MG/1
TABLET, FILM COATED ORAL
COMMUNITY
Start: 2022-10-27 | End: 2022-12-05

## 2022-11-02 RX ORDER — IPRATROPIUM BROMIDE AND ALBUTEROL SULFATE 2.5; .5 MG/3ML; MG/3ML
SOLUTION RESPIRATORY (INHALATION)
COMMUNITY
End: 2022-11-02 | Stop reason: SDUPTHER

## 2022-11-02 RX ORDER — FUROSEMIDE 80 MG/1
TABLET ORAL
COMMUNITY

## 2022-11-02 RX ORDER — IPRATROPIUM BROMIDE AND ALBUTEROL SULFATE 2.5; .5 MG/3ML; MG/3ML
SOLUTION RESPIRATORY (INHALATION)
COMMUNITY

## 2022-11-02 RX ORDER — DOXYCYCLINE 100 MG/1
100 CAPSULE ORAL 2 TIMES DAILY
COMMUNITY
Start: 2022-10-26 | End: 2022-12-05

## 2022-11-02 RX ORDER — AMOXICILLIN AND CLAVULANATE POTASSIUM 875; 125 MG/1; MG/1
TABLET, FILM COATED ORAL
COMMUNITY
Start: 2022-10-26 | End: 2022-11-02 | Stop reason: SDUPTHER

## 2022-11-02 RX ORDER — IBUPROFEN 100 MG/5ML
SUSPENSION, ORAL (FINAL DOSE FORM) ORAL
COMMUNITY

## 2022-11-02 RX ORDER — MULTIVIT-MIN/FA/LYCOPEN/LUTEIN .4-300-25
TABLET ORAL
COMMUNITY

## 2022-11-02 RX ORDER — PREDNISONE 5 MG/1
5 TABLET ORAL DAILY
COMMUNITY
Start: 2022-09-07 | End: 2022-12-05

## 2022-11-02 RX ORDER — ZOSTER VACCINE RECOMBINANT, ADJUVANTED 50 MCG/0.5
KIT INTRAMUSCULAR
COMMUNITY
Start: 2022-07-14 | End: 2022-11-02

## 2022-11-02 RX ORDER — NITROGLYCERIN 400 UG/1
SPRAY ORAL
COMMUNITY

## 2022-11-02 RX ORDER — SILODOSIN 8 MG/1
8 CAPSULE ORAL DAILY
COMMUNITY
Start: 2022-09-07

## 2022-11-02 RX ORDER — VIT C/E/ZN/COPPR/LUTEIN/ZEAXAN 250MG-90MG
CAPSULE ORAL
COMMUNITY

## 2022-11-02 RX ORDER — ACETAMINOPHEN 500 MG
TABLET ORAL
COMMUNITY

## 2022-11-02 RX ORDER — POTASSIUM CHLORIDE 1500 MG/1
TABLET, EXTENDED RELEASE ORAL
COMMUNITY

## 2022-11-02 NOTE — PROGRESS NOTES
Subjective:       Patient ID: Angel Duque Jr. is a 81 y.o. male.    Chief Complaint: Follow-up, Nail Problem, Foot Swelling, Ingrown Toenail, Diabetes Mellitus, and Wound Check  Patient presents today for follow-up due to type 2 diabetes however he is concerned about several open wounds on the right lower leg which he has had for about 6 weeks.  He is taking Augmentin and doxycycline.  He has been prone to these sores since Mohs surgery on the right lower leg 8 years ago.  Typically he can treat them at home without complication however there's been a lot of clear drainage and he is found it has been difficult to apply bandage to the leg as it should be.  Relates diabetes is doing well, no complications, feels well today, no pain of the right leg.  States glucose was 118 this morning.  Does have overall neuropathic and arthritic pain 5/10        Past Medical History:   Diagnosis Date    AAA (abdominal aortic aneurysm)     Arthritis     Rheumatoid & Osteoarthritis    BPH (benign prostatic hyperplasia)     CAD (coronary artery disease)     Chronic bronchitis     COPD (chronic obstructive pulmonary disease)     Depression     Diabetes mellitus, type 2     Diverticulosis     DM (diabetes mellitus)     Dyslipidemia     GERD (gastroesophageal reflux disease)     Gout     Hernia of unspecified site of abdominal cavity without mention of obstruction or gangrene     HTN (hypertension)     Hx of CABG     Hyperlipidemia     Hypothyroidism     Insomnia     Ischemic cardiomyopathy     Lower back pain     Lymphedema     Macular degeneration     MI (myocardial infarction)     HORTENSIA (obstructive sleep apnea)     Peripheral neuropathy     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     Tremor      Past Surgical History:   Procedure Laterality Date    ABDOMINAL AORTIC ANEURYSM REPAIR      ABDOMINAL HERNIA REPAIR      CARPAL TUNNEL RELEASE Right     CORONARY ARTERY BYPASS GRAFT      hemrhoidectomy      HERNIA REPAIR       NASAL SEPTUM SURGERY      TONSILLECTOMY      TOTAL KNEE ARTHROPLASTY      TOTAL KNEE ARTHROPLASTY Bilateral      Family History   Problem Relation Age of Onset    Esophageal cancer Paternal Uncle     Cancer Paternal Uncle 69        esophageal    Esophageal cancer Paternal Grandfather     Cancer Paternal Grandfather     Colon cancer Neg Hx      Social History     Socioeconomic History    Marital status:    Tobacco Use    Smoking status: Former     Types: Cigarettes     Quit date: 1997     Years since quittin.8    Smokeless tobacco: Never   Substance and Sexual Activity    Alcohol use: No    Drug use: No       Current Outpatient Medications   Medication Sig Dispense Refill    albuterol-ipratropium (DUO-NEB) 2.5 mg-0.5 mg/3 mL nebulizer solution Inhale by mouth if needed as directed.  Indications: COPD      allopurinol (ZYLOPRIM) 300 MG tablet Take 300 mg by mouth once daily.       amLODIPine (NORVASC) 5 MG tablet       amoxicillin-clavulanate 875-125mg (AUGMENTIN) 875-125 mg per tablet SMARTSI Tablet(s) By Mouth Every 12 Hours      ascorbic acid, vitamin C, (VITAMIN C) 1000 MG tablet Take 1 tablet by mouth daily.  Indications: vitamin supplement      atorvastatin (LIPITOR) 80 MG tablet Take 80 mg by mouth every evening.       butalbital-acetaminophen-caffeine -40 mg (FIORICET, ESGIC) -40 mg per tablet   2 tab, Oral, q6h, PRN for headache, # 120 tab, 0 Refill(s), Pharmacy: [id number], 179, cm, 21 10:28:00 CDT, Height/Length Measured, 121.6, kg, 21 10:28:00 CDT, Weight Dosing      calcium carb-mag oxide-vit D3 400-167-133 mg-mg-unit Tab Take 1 tablet by mouth daily or as directed.  Indications: supplement      cholecalciferol, vitamin D3, (VITAMIN D3) 50 mcg (2,000 unit) Cap capsule Take 1 capsule by mouth daily.  Indications: vitamin supplement      cyanocobalamin (VITAMIN B-12) 1000 MCG tablet Take 1 tablet by mouth daily.  Indications: vitamin B12 supplement       DICLOFENAC SODIUM (VOLTAREN TOP) Apply 1 % topically as needed (for pain in knees).      doxycycline (VIBRAMYCIN) 100 MG Cap Take 100 mg by mouth 2 (two) times daily.      ENTRESTO  mg per tablet Take 1 tablet by mouth 2 (two) times daily.      finasteride (PROSCAR) 5 mg tablet Take 5 mg by mouth once daily.      fluticasone propionate (FLONASE) 50 mcg/actuation nasal spray by Each Nostril route.      gabapentin (NEURONTIN) 600 MG tablet 600 mg.      glimepiride (AMARYL) 2 MG tablet Take 2 mg by mouth once daily.      HYDROcodone-acetaminophen (NORCO) 7.5-325 mg per tablet Take 1 tablet by mouth 3 (three) times daily as needed.      ipratropium (ATROVENT) 0.02 % nebulizer solution Take 500 mcg by nebulization 4 (four) times daily. Rescue      ipratropium (ATROVENT) 21 mcg (0.03 %) nasal spray SMARTSIG:Both Nares      levothyroxine (SYNTHROID) 88 MCG tablet Take 88 mcg by mouth before breakfast.       metFORMIN (GLUCOPHAGE) 850 MG tablet   = 1 tab, Oral, BID, *ZYDUS*., # 180 tab, 3 Refill(s), Maintenance, Pharmacy: [facility], 179, cm, 05/03/21 12:19:00 CDT, Height/Length Measured, 117.8, kg, 05/03/21 12:19:00 CDT, Weight Dosing      methocarbamoL (ROBAXIN) 750 MG Tab Take 750 mg by mouth nightly.      metoprolol succinate (TOPROL-XL) 50 MG 24 hr tablet       multivit-min-FA-lycopen-lutein (CENTRUM SILVER) 0.4 mg-300 mcg- 250 mcg Tab Take 1 tablet by mouth daily.  Indications: vitamin supplement      omeprazole (PRILOSEC) 40 MG capsule Take 40 mg by mouth 2 (two) times daily.      potassium chloride (K-TAB) 20 mEq Take 1 tablet by mouth only if needed as directed, with Furosemide.  Indications: potassium levels      ranibizumab (LUCENTIS) 0.3 mg/0.05 mL Soln Inject as directed.  Indications: macular degeneration/eye condition      ranolazine (RANEXA) 1,000 mg Tb12 Take 1,000 mg by mouth 2 (two) times daily.      riTUXimab (RITUXAN) 10 mg/mL injection Inject as directed.  Indications:  rheumatoid arthritis      senna-docusate 8.6-50 mg (PERICOLACE) 8.6-50 mg per tablet Take 1 tablet by mouth if needed as directed.  Indications: constipation      sertraline (ZOLOFT) 100 MG tablet   = 1 tab, Oral, Daily, # 90 tab, 3 Refill(s), Maintenance, Pharmacy: [facility], 179, cm, 21 14:52:00 CDT, Height/Length Measured, 117.02, kg, 21 14:52:00 CDT, Weight Dosing      silodosin (RAPAFLO) 8 mg Cap capsule Take 8 mg by mouth once daily.      sodium chloride 0.9% SolP 500 mL with riTUXimab 10 mg/mL Conc 375 mg/m2       trazodone (DESYREL) 150 MG tablet Take 150 mg by mouth every evening.      vit C,O-Jn-xvzqj-lutein-zeaxan (PRESERVISION AREDS-2) 250-90-40-1 mg Cap Take 1 capsule by mouth twice daily.  Indications: vitamin supplement      vitamin E 400 UNIT capsule Take 1 capsule by mouth daily.  Indications: vitamin supplement      carbidopa-levodopa  mg (SINEMET)  mg per tablet   1 tab, Oral, HS, # 90 tab, 3 Refill(s), Pharmacy: [id number], 179, cm, 21 14:33:00 CDT, Height/Length Measured, 118.3, kg, 21 14:33:00 CDT, Weight Dosing      furosemide (LASIX) 80 MG tablet Take 1 tablet by mouth only if needed as directed.  Indications: edema      nitroGLYCERIN 0.4 MG/DOSE TL SPRY (NITROLINGUAL) 400 mcg/spray spray Use as directed as needed.  Indications: angina/acute chest pain      predniSONE (DELTASONE) 5 MG tablet Take 5 mg by mouth once daily.      STIOLTO RESPIMAT 2.5-2.5 mcg/actuation Mist SMARTSI Puff(s) Via Inhaler Daily       No current facility-administered medications for this visit.     Review of patient's allergies indicates:   Allergen Reactions    Pregabalin      Other reaction(s): Depression    Flomax [tamsulosin]      Made extremity weak    Roflumilast     Sulfa (sulfonamide antibiotics)     Sulfa dyne     Sulfur      Old type not new types of sulfur    Latex Itching    Tegretol [carbamazepine] Itching and Rash       Review of Systems  "  HENT:  Negative for congestion.    Respiratory:  Negative for cough and shortness of breath.    Cardiovascular:  Positive for leg swelling.        Considerable edema of the right lower extremity, controlled on the left    Endocrine:        >10 yr DM2   Musculoskeletal:  Positive for gait problem.        Walker, limited mobility    Skin:  Positive for wound.        Right lower leg   All other systems reviewed and are negative.    Objective:      Vitals:    11/02/22 1008   BP: 136/65   Pulse: (!) 56   Resp: 16   Weight: 116.7 kg (257 lb 4.4 oz)   Height: 5' 10" (1.778 m)     Physical Exam  Vitals and nursing note reviewed.   Constitutional:       General: He is not in acute distress.  Cardiovascular:      Pulses:           Dorsalis pedis pulses are 1+ on the right side and 1+ on the left side.        Posterior tibial pulses are 1+ on the right side and 1+ on the left side.   Pulmonary:      Effort: Pulmonary effort is normal.   Musculoskeletal:      Right lower leg: Edema present.      Right foot: Decreased range of motion (Arthritic and degenerative changes bilateral feet limited range of motion 1st MPJ and midfoot bilateral). Bunion present.      Left foot: Decreased range of motion.   Feet:      Right foot:      Skin integrity: No skin breakdown or erythema.      Toenail Condition: Right toenails are long.      Left foot:      Skin integrity: No skin breakdown or erythema (chronic incurvated nails, no erythema b/l hallux).      Toenail Condition: Left toenails are long.   Skin:     Capillary Refill: Capillary refill takes 2 to 3 seconds.      Findings: Erythema present.      Comments: several large superficial stasis ulcers anterior lateral right lower leg with clear drainage   Psychiatric:         Mood and Affect: Mood normal.         Behavior: Behavior normal.         Thought Content: Thought content normal.         Judgment: Judgment normal.                                   Assessment:       1. Venous stasis " ulcer of right lower leg with edema of right lower leg    2. Type 2 diabetes, controlled, with neuropathy    3. Edema of both lower legs    4. Limited mobility    5. Arthritis    6. Incurved toenail        Plan:         C&S STASIS ULCER RIGHT LOWER LEG  HOME HEALTH WOUND CARE ORDERS    Reviewed with patient concern for infection, obviously continue previously prescribed antibiotics as directed.  Reviewed need to culture wounds today for appropriate antibiotic therapy  Advised patient home health is needed to help change bandage every other day, with this type of ulcer patient understands controlling drainage, moisture light compression for swelling is crucial to heal these areas.  Wound care today ulcers healed with wound wash, iodine applied, non adherent Aquacel of absorbent foam applied with conform and Ace bandage.  Advised patient similar orders will be placed for home health, with a more effective lower extremity compression bandage applied.  Patient understands compression is needed to reduce tension on the skin and to contact home health or our office or have someone remove any bandage on the leg which is uncomfortable or too tight  Reviewed swelling and elevation, at all times possible  Reviewed diabetic education   Reviewed care and maintenance of nails, nails debrided, monitor incurvated nails, reassured patient no sign of ingrown, infection  Reviewed arthritis, presents in appropriate wide shoes     Reviewed need for daily foot checks and instructed patient to contact the office with any area of redness or swelling which has not improved within 3 days.  Patient was in understanding and agreement with treatment plan.  I counseled the patient on their conditions, implications and medical management.  Instructed patient to contact the office with any changes, questions, concerns, worsening of symptoms.   Total face-to-face time, exam, assessment, treatment, discussion, documentation 30 minutes, more than  half this time spent on consultation and coordination of care.  Follow up 4 weeks. Will call with culture results     This note was created using [employer] voice recognition software that occasionally misinterpreted phrases or words.

## 2022-11-04 PROCEDURE — G0180 MD CERTIFICATION HHA PATIENT: HCPCS | Mod: ,,, | Performed by: PODIATRIST

## 2022-11-04 PROCEDURE — G0180 PR HOME HEALTH MD CERTIFICATION: ICD-10-PCS | Mod: ,,, | Performed by: PODIATRIST

## 2022-11-05 LAB — BACTERIA SPEC AEROBE CULT: ABNORMAL

## 2022-11-05 NOTE — PROGRESS NOTES
First thing Monday morning please call Dr. Davidson's office to obtain fax number, inform them we are sending culture results over for Mr Neto who needs oral antibiotic changed.  I have no lab work in our system and am not aware of his kidney function therefore would be in patient's best interest for PCP to prescribe antibiotic.  Then please call patient and inform him of the plan to get him on proper antibiotic therapy.  Inquire if home health is coming out as ordered.  Thank you

## 2022-11-07 ENCOUNTER — TELEPHONE (OUTPATIENT)
Dept: PODIATRY | Facility: CLINIC | Age: 82
End: 2022-11-07
Payer: MEDICARE

## 2022-11-07 NOTE — TELEPHONE ENCOUNTER
Spoke with Dr. Davidson's nurse, Corrie. Advised that I would be sending over office visit notes and culture results for patient so Dr. Davidson could prescribe proper antibiotics based on kidney function. Nurse verbalized understanding.      ----- Message from Mary Marvin DPM sent at 11/5/2022 12:18 PM CDT -----  First thing Monday morning please call Dr. Davidson's office to obtain fax number, inform them we are sending culture results over for Mr Duque who needs oral antibiotic changed.  I have no lab work in our system and am not aware of his kidney functi  on therefore would be in patient's best interest for PCP to prescribe antibiotic.  Then please call patient and inform him of the plan to get him on proper antibiotic therapy.  Inquire if home health is coming out as ordered.  Thank you

## 2022-11-07 NOTE — TELEPHONE ENCOUNTER
Spoke with patient and advised that the culture results and notes from last visit were sent to Dr. Davidson so proper antibiotics can be prescribed based on kidney function since our office doesn't have recent labs on patient. Patient verbalized understanding. Patient states that home health was out on Friday for a dressing change on wound and are supposed to be out today for a dressing change. Advised patient to call our office with any questions or concerns.    ----- Message from Mary Marvin DPM sent at 11/5/2022 12:18 PM CDT -----  First thing Monday morning please call Dr. Davidson's office to obtain fax number, inform them we are sending culture results over for Mr Duque who needs oral antibiotic changed.  I have no lab work in our system and am not aware of his kidney functi  on therefore would be in patient's best interest for PCP to prescribe antibiotic.  Then please call patient and inform him of the plan to get him on proper antibiotic therapy.  Inquire if home health is coming out as ordered.  Thank you

## 2022-11-08 ENCOUNTER — TELEPHONE (OUTPATIENT)
Dept: PODIATRY | Facility: CLINIC | Age: 82
End: 2022-11-08
Payer: MEDICARE

## 2022-11-16 ENCOUNTER — EXTERNAL HOME HEALTH (OUTPATIENT)
Dept: HOME HEALTH SERVICES | Facility: HOSPITAL | Age: 82
End: 2022-11-16
Payer: MEDICARE

## 2022-12-05 ENCOUNTER — OFFICE VISIT (OUTPATIENT)
Dept: PODIATRY | Facility: CLINIC | Age: 82
End: 2022-12-05
Payer: MEDICARE

## 2022-12-05 VITALS
SYSTOLIC BLOOD PRESSURE: 104 MMHG | RESPIRATION RATE: 18 BRPM | DIASTOLIC BLOOD PRESSURE: 53 MMHG | HEART RATE: 54 BPM | BODY MASS INDEX: 35.79 KG/M2 | HEIGHT: 70 IN | WEIGHT: 250 LBS

## 2022-12-05 DIAGNOSIS — A49.8 PSEUDOMONAS AERUGINOSA INFECTION: ICD-10-CM

## 2022-12-05 DIAGNOSIS — R60.0 VENOUS STASIS ULCER OF RIGHT LOWER LEG WITH EDEMA OF RIGHT LOWER LEG: Primary | ICD-10-CM

## 2022-12-05 DIAGNOSIS — I83.019 VENOUS STASIS ULCER OF RIGHT LOWER LEG WITH EDEMA OF RIGHT LOWER LEG: Primary | ICD-10-CM

## 2022-12-05 DIAGNOSIS — L97.919 VENOUS STASIS ULCER OF RIGHT LOWER LEG WITH EDEMA OF RIGHT LOWER LEG: Primary | ICD-10-CM

## 2022-12-05 DIAGNOSIS — E11.40 TYPE 2 DIABETES, CONTROLLED, WITH NEUROPATHY: ICD-10-CM

## 2022-12-05 DIAGNOSIS — I83.891 VENOUS STASIS ULCER OF RIGHT LOWER LEG WITH EDEMA OF RIGHT LOWER LEG: Primary | ICD-10-CM

## 2022-12-05 PROCEDURE — 99999 PR PBB SHADOW E&M-EST. PATIENT-LVL V: CPT | Mod: PBBFAC,,, | Performed by: PODIATRIST

## 2022-12-05 PROCEDURE — 99215 OFFICE O/P EST HI 40 MIN: CPT | Mod: PBBFAC,PN | Performed by: PODIATRIST

## 2022-12-05 PROCEDURE — 99213 PR OFFICE/OUTPT VISIT, EST, LEVL III, 20-29 MIN: ICD-10-PCS | Mod: S$PBB,,, | Performed by: PODIATRIST

## 2022-12-05 PROCEDURE — 99999 PR PBB SHADOW E&M-EST. PATIENT-LVL V: ICD-10-PCS | Mod: PBBFAC,,, | Performed by: PODIATRIST

## 2022-12-05 PROCEDURE — 99213 OFFICE O/P EST LOW 20 MIN: CPT | Mod: S$PBB,,, | Performed by: PODIATRIST

## 2022-12-05 RX ORDER — AMOXICILLIN AND CLAVULANATE POTASSIUM 875; 125 MG/1; MG/1
TABLET, FILM COATED ORAL
COMMUNITY
Start: 2022-11-09 | End: 2022-12-05

## 2022-12-05 RX ORDER — CARBIDOPA AND LEVODOPA 25; 100 MG/1; MG/1
TABLET ORAL
COMMUNITY
Start: 2022-10-06

## 2022-12-05 RX ORDER — BUTALBITAL, ACETAMINOPHEN AND CAFFEINE 50; 325; 40 MG/1; MG/1; MG/1
TABLET ORAL
COMMUNITY
Start: 2022-08-26

## 2022-12-05 RX ORDER — METHOCARBAMOL 500 MG/1
TABLET, FILM COATED ORAL
COMMUNITY

## 2022-12-05 RX ORDER — CIPROFLOXACIN 500 MG/1
500 TABLET ORAL 2 TIMES DAILY
Qty: 20 TABLET | Refills: 1 | Status: SHIPPED | OUTPATIENT
Start: 2022-12-05 | End: 2022-12-15

## 2022-12-05 RX ORDER — DOXYCYCLINE 100 MG/1
CAPSULE ORAL
COMMUNITY
Start: 2022-11-09 | End: 2022-12-05

## 2022-12-05 RX ORDER — LEVOFLOXACIN 500 MG/1
500 TABLET, FILM COATED ORAL
COMMUNITY
Start: 2022-11-09 | End: 2022-12-05

## 2022-12-06 NOTE — PROGRESS NOTES
Subjective:       Patient ID: Angel Duque Jr. is a 82 y.o. male.    Chief Complaint: Follow-up, Diabetes Mellitus, and Wound Check  Patient presents today for follow-up stasis ulcers RLE with edema, type 2 diabetes.  PCP had him on Augmentin and doxycycline, this was changed to Levaquin after Pseudomonas positive culture.  Patient confirms receiving phone call from the office with culture results and taking antibiotic as directed.  Relates [facility] home health has been coming out Monday Wednesday Friday and are in need of new orders.  He relates home health is going very well, very helpful, swelling has improved considerably and relates a lot less drainage, decreased pain. Relates diabetes has been doing well with glucose 135 checked yesterday at home.      Past Medical History:   Diagnosis Date    AAA (abdominal aortic aneurysm)     Arthritis     Rheumatoid & Osteoarthritis    BPH (benign prostatic hyperplasia)     CAD (coronary artery disease)     Chronic bronchitis     COPD (chronic obstructive pulmonary disease)     Depression     Diabetes mellitus, type 2     Diverticulosis     DM (diabetes mellitus)     Dyslipidemia     GERD (gastroesophageal reflux disease)     Gout     Hernia of unspecified site of abdominal cavity without mention of obstruction or gangrene     HTN (hypertension)     Hx of CABG     Hyperlipidemia     Hypothyroidism     Insomnia     Ischemic cardiomyopathy     Lower back pain     Lymphedema     Macular degeneration     MI (myocardial infarction)     HORTENSIA (obstructive sleep apnea)     Peripheral neuropathy     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     Tremor      Past Surgical History:   Procedure Laterality Date    ABDOMINAL AORTIC ANEURYSM REPAIR      ABDOMINAL HERNIA REPAIR      CARPAL TUNNEL RELEASE Right     CORONARY ARTERY BYPASS GRAFT      hemrhoidectomy      HERNIA REPAIR      NASAL SEPTUM SURGERY      TONSILLECTOMY      TOTAL KNEE ARTHROPLASTY      TOTAL KNEE  ARTHROPLASTY Bilateral      Family History   Problem Relation Age of Onset    Esophageal cancer Paternal Uncle     Cancer Paternal Uncle 69        esophageal    Esophageal cancer Paternal Grandfather     Cancer Paternal Grandfather     Colon cancer Neg Hx      Social History     Socioeconomic History    Marital status:    Tobacco Use    Smoking status: Former     Types: Cigarettes     Quit date: 1997     Years since quittin.9    Smokeless tobacco: Never   Substance and Sexual Activity    Alcohol use: No    Drug use: No       Current Outpatient Medications   Medication Sig Dispense Refill    albuterol-ipratropium (DUO-NEB) 2.5 mg-0.5 mg/3 mL nebulizer solution Inhale by mouth if needed as directed.  Indications: COPD      allopurinol (ZYLOPRIM) 300 MG tablet Take 300 mg by mouth once daily.       amLODIPine (NORVASC) 5 MG tablet       ascorbic acid, vitamin C, (VITAMIN C) 1000 MG tablet Take 1 tablet by mouth daily.  Indications: vitamin supplement      atorvastatin (LIPITOR) 80 MG tablet Take 80 mg by mouth every evening.       butalbital-acetaminophen-caffeine -40 mg (FIORICET, ESGIC) -40 mg per tablet   2 tab, Oral, q6h, PRN for headache, # 120 tab, 0 Refill(s), [facility]acy: [id number], 175, cm, 08/15/22 13:26:00 CDT, Height/Length Measured, 112, kg, 22 11:07:00 CDT, Weight Dosing      calcium carb-mag oxide-vit D3 400-167-133 mg-mg-unit Tab Take 1 tablet by mouth daily or as directed.  Indications: supplement      carbidopa-levodopa  mg (SINEMET)  mg per tablet   90 tab, 0 Refill(s)      cholecalciferol, vitamin D3, (VITAMIN D3) 50 mcg (2,000 unit) Cap capsule Take 1 capsule by mouth daily.  Indications: vitamin supplement      cyanocobalamin (VITAMIN B-12) 1000 MCG tablet Take 1 tablet by mouth daily.  Indications: vitamin B12 supplement      DICLOFENAC SODIUM (VOLTAREN TOP) Apply 1 % topically as needed (for pain in knees).      ENTRESTO  mg per tablet  Take 1 tablet by mouth 2 (two) times daily.      finasteride (PROSCAR) 5 mg tablet Take 5 mg by mouth once daily.      fluticasone propionate (FLONASE) 50 mcg/actuation nasal spray by Each Nostril route.      furosemide (LASIX) 80 MG tablet Take 1 tablet by mouth only if needed as directed.  Indications: edema      gabapentin (NEURONTIN) 600 MG tablet 600 mg.      glimepiride (AMARYL) 2 MG tablet Take 2 mg by mouth once daily.      HYDROcodone-acetaminophen (NORCO) 7.5-325 mg per tablet Take 1 tablet by mouth 3 (three) times daily as needed.      ipratropium (ATROVENT) 0.02 % nebulizer solution Take 500 mcg by nebulization 4 (four) times daily. Rescue      ipratropium (ATROVENT) 21 mcg (0.03 %) nasal spray SMARTSIG:Both Nares      levothyroxine (SYNTHROID) 88 MCG tablet Take 88 mcg by mouth before breakfast.       metFORMIN (GLUCOPHAGE) 850 MG tablet   = 1 tab, Oral, BID, *ZYDUS*., # 180 tab, 3 Refill(s), Maintenance, Pharmacy: [facility], 179, cm, 05/03/21 12:19:00 CDT, Height/Length Measured, 117.8, kg, 05/03/21 12:19:00 CDT, Weight Dosing      methocarbamoL (ROBAXIN) 500 MG Tab Take 1 tablet by mouth in the morning if needed.  Indications: muscle spasm      methocarbamoL (ROBAXIN) 750 MG Tab Take 750 mg by mouth nightly.      metoprolol succinate (TOPROL-XL) 50 MG 24 hr tablet       multivit-min-FA-lycopen-lutein (CENTRUM SILVER) 0.4 mg-300 mcg- 250 mcg Tab Take 1 tablet by mouth daily.  Indications: vitamin supplement      omeprazole (PRILOSEC) 40 MG capsule Take 40 mg by mouth 2 (two) times daily.      potassium chloride (K-TAB) 20 mEq Take 1 tablet by mouth only if needed as directed, with Furosemide.  Indications: potassium levels      ranibizumab (LUCENTIS) 0.3 mg/0.05 mL Soln Inject as directed.  Indications: macular degeneration/eye condition      ranolazine (RANEXA) 1,000 mg Tb12 Take 1,000 mg by mouth 2 (two) times daily.      riTUXimab (RITUXAN) 10 mg/mL injection Inject as  directed.  Indications: rheumatoid arthritis      senna-docusate 8.6-50 mg (PERICOLACE) 8.6-50 mg per tablet Take 1 tablet by mouth if needed as directed.  Indications: constipation      sertraline (ZOLOFT) 100 MG tablet   = 1 tab, Oral, Daily, # 90 tab, 3 Refill(s), Maintenance, Pharmacy: [facility], 179, cm, 21 14:52:00 CDT, Height/Length Measured, 117.02, kg, 21 14:52:00 CDT, Weight Dosing      silodosin (RAPAFLO) 8 mg Cap capsule Take 8 mg by mouth once daily.      sodium chloride 0.9% SolP 500 mL with riTUXimab 10 mg/mL Conc 375 mg/m2       STIOLTO RESPIMAT 2.5-2.5 mcg/actuation Mist SMARTSI Puff(s) Via Inhaler Daily      trazodone (DESYREL) 150 MG tablet Take 150 mg by mouth every evening.      vit C,F-Am-vczti-lutein-zeaxan (PRESERVISION AREDS-2) 250-90-40-1 mg Cap Take 1 capsule by mouth twice daily.  Indications: vitamin supplement      vitamin E 400 UNIT capsule Take 1 capsule by mouth daily.  Indications: vitamin supplement      ciprofloxacin HCl (CIPRO) 500 MG tablet Take 1 tablet (500 mg total) by mouth 2 (two) times daily. for 10 days 20 tablet 1    nitroGLYCERIN 0.4 MG/DOSE TL SPRY (NITROLINGUAL) 400 mcg/spray spray Use as directed as needed.  Indications: angina/acute chest pain       No current facility-administered medications for this visit.     Review of patient's allergies indicates:   Allergen Reactions    Pregabalin      Other reaction(s): Depression    Flomax [tamsulosin]      Made extremity weak    Roflumilast     Sulfa (sulfonamide antibiotics)     Sulfa dyne     Sulfur      Old type not new types of sulfur    Latex Itching    Tegretol [carbamazepine] Itching and Rash       Review of Systems   HENT:  Negative for congestion.    Respiratory:  Negative for cough.    Cardiovascular:  Positive for leg swelling.        Much improved on the right lower extremity, controlled on the left    Endocrine:        >10 yr DM2   Musculoskeletal:  Positive for gait problem.  "       Walker, limited mobility    Skin:  Positive for wound.        Right lower leg   All other systems reviewed and are negative.    Objective:      Vitals:    12/05/22 0956   BP: (!) 104/53   Pulse: (!) 54   Resp: 18   Weight: 113.4 kg (250 lb)   Height: 5' 10" (1.778 m)     Physical Exam  Vitals and nursing note reviewed.   Constitutional:       General: He is not in acute distress.  Cardiovascular:      Pulses:           Dorsalis pedis pulses are 1+ on the right side and 1+ on the left side.        Posterior tibial pulses are 1+ on the right side and 1+ on the left side.   Pulmonary:      Effort: Pulmonary effort is normal.   Musculoskeletal:      Right lower leg: Edema present.      Right foot: Decreased range of motion (Arthritic and degenerative changes bilateral feet limited range of motion 1st MPJ and midfoot bilateral). Bunion present.      Left foot: Decreased range of motion.   Feet:      Right foot:      Skin integrity: No skin breakdown.      Left foot:      Skin integrity: No skin breakdown.   Skin:     Capillary Refill: Capillary refill takes 2 to 3 seconds.      Findings: Erythema present.      Comments: Significant improvement in several healing superficial stasis ulcers anterior and lateral right lower leg, 1 superficial area on the medial aspect with clear drainage, mild erythema and calor   Psychiatric:         Mood and Affect: Mood normal.         Behavior: Behavior normal.         Thought Content: Thought content normal.         Judgment: Judgment normal.                                                     Assessment:       1. Venous stasis ulcer of right lower leg with edema of right lower leg    2. Pseudomonas aeruginosa infection    3. Type 2 diabetes, controlled, with neuropathy        Plan:         CIPRO 500MG DAILY  HOME HEALTH WOUND CARE ORDERS - UPDATED        Advised patient there has been a significant improvement stasis ulcers right lower leg with significant reduction of swelling " in the lower extremity, these areas are more dry, significant decrease in redness and warmth of skin.  Advised there is 1 area on the inside with minimal clear weeping noted  We reviewed previous culture results positive Pseudomonas and advised patient this is due to moisture.  Advised home health orders will be updated to include cleansing leg with ascetic acid which is specific for Pseudomonas, samples dispensed to patient  We discussed changing antibiotic from Levaquin to Cipro.  It was ordered twice daily, however after the order was sent through I instructed patient with the topical use of ascetic acid I would like him to take Cipro 500 mg 1 daily, not twice a day.  Patient verbalized understanding  Will continue home health Monday Wednesday Friday until healed with instructions to contact the office immediately with any changes  Wound care today right lower leg cleansed with ascetic acid and allow to thoroughly dry.  Silver alginate, gauze and light Kerlix and Coban lower extremity compression dressing applied  Patient was in understanding and agreement with treatment plan.  I counseled the patient on their conditions, implications and medical management.  Instructed patient to contact the office with any changes, questions, concerns, worsening of symptoms.   Total face-to-face time, exam, assessment, treatment, discussion, documentation 20 minutes, more than half this time spent on consultation and coordination of care.  Follow up 8 weeks     This note was created using M*[employer] voice recognition software that occasionally misinterpreted phrases or words.

## 2022-12-14 ENCOUNTER — DOCUMENT SCAN (OUTPATIENT)
Dept: HOME HEALTH SERVICES | Facility: HOSPITAL | Age: 82
End: 2022-12-14
Payer: MEDICARE

## 2022-12-20 ENCOUNTER — DOCUMENT SCAN (OUTPATIENT)
Dept: HOME HEALTH SERVICES | Facility: HOSPITAL | Age: 82
End: 2022-12-20
Payer: MEDICARE

## 2022-12-28 ENCOUNTER — DOCUMENT SCAN (OUTPATIENT)
Dept: HOME HEALTH SERVICES | Facility: HOSPITAL | Age: 82
End: 2022-12-28
Payer: MEDICARE

## 2023-01-03 PROCEDURE — G0179 PR HOME HEALTH MD RECERTIFICATION: ICD-10-PCS | Mod: ,,, | Performed by: PODIATRIST

## 2023-01-03 PROCEDURE — G0179 MD RECERTIFICATION HHA PT: HCPCS | Mod: ,,, | Performed by: PODIATRIST

## 2023-01-11 ENCOUNTER — EXTERNAL HOME HEALTH (OUTPATIENT)
Dept: HOME HEALTH SERVICES | Facility: HOSPITAL | Age: 83
End: 2023-01-11
Payer: MEDICARE

## 2023-01-13 ENCOUNTER — DOCUMENT SCAN (OUTPATIENT)
Dept: HOME HEALTH SERVICES | Facility: HOSPITAL | Age: 83
End: 2023-01-13
Payer: MEDICARE

## 2023-01-26 ENCOUNTER — EXTERNAL HOME HEALTH (OUTPATIENT)
Dept: HOME HEALTH SERVICES | Facility: HOSPITAL | Age: 83
End: 2023-01-26
Payer: MEDICARE

## 2023-01-26 DIAGNOSIS — L97.919 VENOUS STASIS ULCER OF RIGHT LOWER LEG WITH EDEMA OF RIGHT LOWER LEG: Primary | ICD-10-CM

## 2023-01-26 DIAGNOSIS — I83.019 VENOUS STASIS ULCER OF RIGHT LOWER LEG WITH EDEMA OF RIGHT LOWER LEG: Primary | ICD-10-CM

## 2023-01-26 DIAGNOSIS — R60.0 VENOUS STASIS ULCER OF RIGHT LOWER LEG WITH EDEMA OF RIGHT LOWER LEG: Primary | ICD-10-CM

## 2023-01-26 DIAGNOSIS — I83.891 VENOUS STASIS ULCER OF RIGHT LOWER LEG WITH EDEMA OF RIGHT LOWER LEG: Primary | ICD-10-CM

## 2023-02-06 ENCOUNTER — OFFICE VISIT (OUTPATIENT)
Dept: PODIATRY | Facility: CLINIC | Age: 83
End: 2023-02-06
Payer: MEDICARE

## 2023-02-06 VITALS
HEIGHT: 70 IN | DIASTOLIC BLOOD PRESSURE: 60 MMHG | SYSTOLIC BLOOD PRESSURE: 111 MMHG | BODY MASS INDEX: 35.79 KG/M2 | WEIGHT: 250 LBS | HEART RATE: 61 BPM | RESPIRATION RATE: 18 BRPM

## 2023-02-06 DIAGNOSIS — I83.891 VENOUS STASIS ULCER OF RIGHT LOWER LEG WITH EDEMA OF RIGHT LOWER LEG: Primary | ICD-10-CM

## 2023-02-06 DIAGNOSIS — L97.919 VENOUS STASIS ULCER OF RIGHT LOWER LEG WITH EDEMA OF RIGHT LOWER LEG: Primary | ICD-10-CM

## 2023-02-06 DIAGNOSIS — I83.019 VENOUS STASIS ULCER OF RIGHT LOWER LEG WITH EDEMA OF RIGHT LOWER LEG: Primary | ICD-10-CM

## 2023-02-06 DIAGNOSIS — R60.0 EDEMA OF RIGHT LOWER EXTREMITY: ICD-10-CM

## 2023-02-06 DIAGNOSIS — E11.40 TYPE 2 DIABETES, CONTROLLED, WITH NEUROPATHY: ICD-10-CM

## 2023-02-06 DIAGNOSIS — L60.0 INGROWN NAIL OF GREAT TOE OF RIGHT FOOT: ICD-10-CM

## 2023-02-06 DIAGNOSIS — R60.0 VENOUS STASIS ULCER OF RIGHT LOWER LEG WITH EDEMA OF RIGHT LOWER LEG: Primary | ICD-10-CM

## 2023-02-06 PROCEDURE — 99214 OFFICE O/P EST MOD 30 MIN: CPT | Mod: S$PBB,,, | Performed by: PODIATRIST

## 2023-02-06 PROCEDURE — 99215 OFFICE O/P EST HI 40 MIN: CPT | Mod: PBBFAC,PN | Performed by: PODIATRIST

## 2023-02-06 PROCEDURE — 99999 PR PBB SHADOW E&M-EST. PATIENT-LVL V: ICD-10-PCS | Mod: PBBFAC,,, | Performed by: PODIATRIST

## 2023-02-06 PROCEDURE — 99999 PR PBB SHADOW E&M-EST. PATIENT-LVL V: CPT | Mod: PBBFAC,,, | Performed by: PODIATRIST

## 2023-02-06 PROCEDURE — 99214 PR OFFICE/OUTPT VISIT, EST, LEVL IV, 30-39 MIN: ICD-10-PCS | Mod: S$PBB,,, | Performed by: PODIATRIST

## 2023-02-06 RX ORDER — METOPROLOL SUCCINATE 25 MG/1
25 TABLET, EXTENDED RELEASE ORAL DAILY
COMMUNITY

## 2023-02-06 NOTE — PROGRESS NOTES
Subjective:       Patient ID: Angel Duque Jr. is a 82 y.o. male.    Chief Complaint: Diabetes Mellitus, Follow-up, and Wound Check  Patient presents for follow up type 2 diabetes, relates recently however he scraped an area on the right lower leg which has opened, clear fluid red and swollen again.  This is an area where he had multiple stasis ulcers which healed with the health help of [facility].  Relates he does have prescription for Cipro at home.  Complaint of ingrown nail right great toe.  Overall pain level 6/10      Past Medical History:   Diagnosis Date    AAA (abdominal aortic aneurysm)     Arthritis     Rheumatoid & Osteoarthritis    BPH (benign prostatic hyperplasia)     CAD (coronary artery disease)     Chronic bronchitis     COPD (chronic obstructive pulmonary disease)     Depression     Diabetes mellitus, type 2     Diverticulosis     DM (diabetes mellitus)     Dyslipidemia     GERD (gastroesophageal reflux disease)     Gout     Hernia of unspecified site of abdominal cavity without mention of obstruction or gangrene     HTN (hypertension)     Hx of CABG     Hyperlipidemia     Hypothyroidism     Insomnia     Ischemic cardiomyopathy     Lower back pain     Lymphedema     Macular degeneration     MI (myocardial infarction)     HORTENSIA (obstructive sleep apnea)     Peripheral neuropathy     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     Tremor      Past Surgical History:   Procedure Laterality Date    ABDOMINAL AORTIC ANEURYSM REPAIR      ABDOMINAL HERNIA REPAIR      CARPAL TUNNEL RELEASE Right     CORONARY ARTERY BYPASS GRAFT      hemrhoidectomy      HERNIA REPAIR      NASAL SEPTUM SURGERY      TONSILLECTOMY      TOTAL KNEE ARTHROPLASTY      TOTAL KNEE ARTHROPLASTY Bilateral      Family History   Problem Relation Age of Onset    Esophageal cancer Paternal Uncle     Cancer Paternal Uncle 69        esophageal    Esophageal cancer Paternal Grandfather     Cancer Paternal Grandfather      Colon cancer Neg Hx      Social History     Socioeconomic History    Marital status:    Tobacco Use    Smoking status: Former     Types: Cigarettes     Quit date: 1997     Years since quittin.0    Smokeless tobacco: Never   Substance and Sexual Activity    Alcohol use: No    Drug use: No       Current Outpatient Medications   Medication Sig Dispense Refill    albuterol-ipratropium (DUO-NEB) 2.5 mg-0.5 mg/3 mL nebulizer solution Inhale by mouth if needed as directed.  Indications: COPD      allopurinol (ZYLOPRIM) 300 MG tablet Take 300 mg by mouth once daily.       amLODIPine (NORVASC) 5 MG tablet       ascorbic acid, vitamin C, (VITAMIN C) 1000 MG tablet Take 1 tablet by mouth daily.  Indications: vitamin supplement      atorvastatin (LIPITOR) 80 MG tablet Take 80 mg by mouth every evening.       butalbital-acetaminophen-caffeine -40 mg (FIORICET, ESGIC) -40 mg per tablet   2 tab, Oral, q6h, PRN for headache, # 120 tab, 0 Refill(s), Pharmacy: [id number], 175, cm, 08/15/22 13:26:00 CDT, Height/Length Measured, 112, kg, 22 11:07:00 CDT, Weight Dosing      calcium carb-mag oxide-vit D3 400-167-133 mg-mg-unit Tab Take 1 tablet by mouth daily or as directed.  Indications: supplement      carbidopa-levodopa  mg (SINEMET)  mg per tablet   90 tab, 0 Refill(s)      cholecalciferol, vitamin D3, (VITAMIN D3) 50 mcg (2,000 unit) Cap capsule Take 1 capsule by mouth daily.  Indications: vitamin supplement      DICLOFENAC SODIUM (VOLTAREN TOP) Apply 1 % topically as needed (for pain in knees).      ENTRESTO  mg per tablet Take 1 tablet by mouth 2 (two) times daily.      finasteride (PROSCAR) 5 mg tablet Take 5 mg by mouth once daily.      fluticasone propionate (FLONASE) 50 mcg/actuation nasal spray by Each Nostril route.      furosemide (LASIX) 80 MG tablet Take 1 tablet by mouth only if needed as directed.  Indications: edema      gabapentin (NEURONTIN) 600 MG tablet  600 mg.      glimepiride (AMARYL) 2 MG tablet Take 2 mg by mouth once daily.      HYDROcodone-acetaminophen (NORCO) 7.5-325 mg per tablet Take 1 tablet by mouth 3 (three) times daily as needed.      ipratropium (ATROVENT) 0.02 % nebulizer solution Take 500 mcg by nebulization 4 (four) times daily. Rescue      ipratropium (ATROVENT) 21 mcg (0.03 %) nasal spray SMARTSIG:Both Nares      levothyroxine (SYNTHROID) 88 MCG tablet Take 88 mcg by mouth before breakfast.       metFORMIN (GLUCOPHAGE) 850 MG tablet   = 1 tab, Oral, BID, *ZYDUS*., # 180 tab, 3 Refill(s), Maintenance, Pharmacy: [facility], 179, cm, 05/03/21 12:19:00 CDT, Height/Length Measured, 117.8, kg, 05/03/21 12:19:00 CDT, Weight Dosing      methocarbamoL (ROBAXIN) 500 MG Tab Take 1 tablet by mouth in the morning if needed.  Indications: muscle spasm      methocarbamoL (ROBAXIN) 750 MG Tab Take 750 mg by mouth nightly.      metoprolol succinate (TOPROL-XL) 25 MG 24 hr tablet Take 25 mg by mouth once daily.      multivit-min-FA-lycopen-lutein (CENTRUM SILVER) 0.4 mg-300 mcg- 250 mcg Tab Take 1 tablet by mouth daily.  Indications: vitamin supplement      nitroGLYCERIN 0.4 MG/DOSE TL SPRY (NITROLINGUAL) 400 mcg/spray spray Use as directed as needed.  Indications: angina/acute chest pain      omeprazole (PRILOSEC) 40 MG capsule Take 40 mg by mouth 2 (two) times daily.      potassium chloride (K-TAB) 20 mEq Take 1 tablet by mouth only if needed as directed, with Furosemide.  Indications: potassium levels      ranibizumab (LUCENTIS) 0.3 mg/0.05 mL Soln Inject as directed.  Indications: macular degeneration/eye condition      ranolazine (RANEXA) 1,000 mg Tb12 Take 1,000 mg by mouth 2 (two) times daily.      riTUXimab (RITUXAN) 10 mg/mL injection Inject as directed.  Indications: rheumatoid arthritis      senna-docusate 8.6-50 mg (PERICOLACE) 8.6-50 mg per tablet Take 1 tablet by mouth if needed as directed.  Indications: constipation       "sertraline (ZOLOFT) 100 MG tablet   = 1 tab, Oral, Daily, # 90 tab, 3 Refill(s), Maintenance, [facility]: [facility], 179, cm, 21 14:52:00 CDT, Height/Length Measured, 117.02, kg, 21 14:52:00 CDT, Weight Dosing      silodosin (RAPAFLO) 8 mg Cap capsule Take 8 mg by mouth once daily.      sodium chloride 0.9% SolP 500 mL with riTUXimab 10 mg/mL Conc 375 mg/m2       STIOLTO RESPIMAT 2.5-2.5 mcg/actuation Mist SMARTSI Puff(s) Via Inhaler Daily      trazodone (DESYREL) 150 MG tablet Take 150 mg by mouth every evening.      vit C,U-Wz-iyurd-lutein-zeaxan (PRESERVISION AREDS-2) 250-90-40-1 mg Cap Take 1 capsule by mouth twice daily.  Indications: vitamin supplement      vitamin E 400 UNIT capsule Take 1 capsule by mouth daily.  Indications: vitamin supplement       No current facility-administered medications for this visit.     Review of patient's allergies indicates:   Allergen Reactions    Pregabalin      Other reaction(s): Depression    Flomax [tamsulosin]      Made extremity weak    Roflumilast     Sulfa (sulfonamide antibiotics)     Sulfa dyne     Sulfur      Old type not new types of sulfur    Latex Itching    Tegretol [carbamazepine] Itching and Rash       Review of Systems   HENT:  Negative for congestion.    Respiratory:  Negative for cough.    Cardiovascular:  Positive for leg swelling.        Right, compression hose left   Endocrine:        >10 yr DM2   Musculoskeletal:  Positive for gait problem.        Walker, limited mobility    Skin:  Positive for wound.        Right lower leg   All other systems reviewed and are negative.    Objective:      Vitals:    23 0944   BP: 111/60   Pulse: 61   Resp: 18   Weight: 113.4 kg (250 lb)   Height: 5' 10" (1.778 m)     Physical Exam  Vitals and nursing note reviewed.   Constitutional:       General: He is not in acute distress.  Cardiovascular:      Pulses:           Dorsalis pedis pulses are 1+ on the right side and 1+ on the left " side.        Posterior tibial pulses are 1+ on the right side and 1+ on the left side.   Pulmonary:      Effort: Pulmonary effort is normal.   Musculoskeletal:      Right lower leg: Edema present.      Right foot: Decreased range of motion (Arthritic and degenerative changes bilateral feet limited range of motion 1st MPJ and midfoot bilateral). Bunion present.      Left foot: Decreased range of motion.   Feet:      Right foot:      Skin integrity: No skin breakdown or erythema (tender ingrown nail medial right hallux, no infection).      Toenail Condition: Right toenails are abnormally thick and ingrown.      Left foot:      Skin integrity: No skin breakdown.      Toenail Condition: Left toenails are abnormally thick.   Skin:     General: Skin is dry.      Capillary Refill: Capillary refill takes 2 to 3 seconds.      Findings: Erythema and lesion present.      Comments: Macerated superficial stasis ulcer anterior right lower leg with surrounding erythema, minimal calor   Psychiatric:         Mood and Affect: Mood normal.         Behavior: Behavior normal.         Thought Content: Thought content normal.         Judgment: Judgment normal.                                     Assessment:       1. Venous stasis ulcer of right lower leg with edema of right lower leg    2. Type 2 diabetes, controlled, with neuropathy    3. Edema of right lower extremity    4. Ingrown nail of great toe of right foot        Plan:         START CIPRO 500MG DAILY  HOME HEALTH WOUND CARE ORDERS        Reviewed bacteria cultured from wound previously Pseudomonas, due to moisture.  Instructed patient to start low-dose Cipro prescription he already has at home taking 1 daily.  Advised home health will be contacted to start wound care again, clean with ascetic acid, silver alginate compression bandage.  We had  Had a lengthy discussion regarding recurrence of this condition due to damage of the skin, chronic dry skin and chronic swelling.  Patient  verbalized understanding  Resume home health Monday Wednesday Friday until healed with instructions to contact the office immediately with any changes  Wound care today right lower leg cleansed with wound , Silver alginate, gauze, soft stockingette, coban lower extremity compression dressing applied  Reviewed diabetic education, potential complications especially with wound right lower leg  Reviewed with patient the need to keep area dry, clean, elevated  Discussed benefit of tight(er) control of glucose/diabetes   Reviewed appropriate shoes,  especially indoors to protect feet, no flat shoes, slippers or walking in sock or bare feet.    Discussed maintenance of skin and nails and potential complications  Ingrown nail removed/debrided medial hallux.  Triple antibiotic ointment, gauze, coban applied. Patient is not a candidate for nail avulsion at this time, no infection.  Instructed patient to leave dressing intact until the morning, if sore apply small amount of antibiotic ointment and soft dressing daily until any remaining discomfort has resolved, should be pain-free in a few days.    Reviewed need for daily foot checks and instructed patient to contact the office with any area of redness or swelling which has not improved within 3 days.  Patient was in understanding and agreement with treatment plan.  I counseled the patient on their conditions, implications and medical management.  Instructed patient/family to contact the office with any changes, questions, concerns, worsening of symptoms.   Total face to face time 30 minutes, exam, assessment, treatment, discussion, additional time for review of chart prior to and following appointment and visit documentation, consultation and coordination of care.    Follow up 3 months, or sooner if right leg ulcer is not healed in 4 weeks      This note was created using M*[employer] voice recognition software that occasionally misinterpreted phrases or  words.

## 2023-02-28 ENCOUNTER — DOCUMENT SCAN (OUTPATIENT)
Dept: HOME HEALTH SERVICES | Facility: HOSPITAL | Age: 83
End: 2023-02-28
Payer: MEDICARE

## 2023-03-02 ENCOUNTER — EXTERNAL HOME HEALTH (OUTPATIENT)
Dept: HOME HEALTH SERVICES | Facility: HOSPITAL | Age: 83
End: 2023-03-02
Payer: MEDICARE

## 2023-03-14 ENCOUNTER — DOCUMENT SCAN (OUTPATIENT)
Dept: HOME HEALTH SERVICES | Facility: HOSPITAL | Age: 83
End: 2023-03-14
Payer: MEDICARE

## 2023-03-24 ENCOUNTER — DOCUMENT SCAN (OUTPATIENT)
Dept: HOME HEALTH SERVICES | Facility: HOSPITAL | Age: 83
End: 2023-03-24
Payer: MEDICARE

## 2023-04-01 ENCOUNTER — DOCUMENT SCAN (OUTPATIENT)
Dept: HOME HEALTH SERVICES | Facility: HOSPITAL | Age: 83
End: 2023-04-01
Payer: MEDICARE

## 2023-04-09 PROCEDURE — G0179 MD RECERTIFICATION HHA PT: HCPCS | Mod: ,,, | Performed by: PODIATRIST

## 2023-04-09 PROCEDURE — G0179 PR HOME HEALTH MD RECERTIFICATION: ICD-10-PCS | Mod: ,,, | Performed by: PODIATRIST

## 2023-05-08 ENCOUNTER — OFFICE VISIT (OUTPATIENT)
Dept: PODIATRY | Facility: CLINIC | Age: 83
End: 2023-05-08
Payer: MEDICARE

## 2023-05-08 VITALS
WEIGHT: 235 LBS | SYSTOLIC BLOOD PRESSURE: 98 MMHG | RESPIRATION RATE: 16 BRPM | BODY MASS INDEX: 33.64 KG/M2 | HEIGHT: 70 IN | HEART RATE: 63 BPM | DIASTOLIC BLOOD PRESSURE: 55 MMHG

## 2023-05-08 DIAGNOSIS — R60.0 EDEMA OF RIGHT LOWER EXTREMITY: ICD-10-CM

## 2023-05-08 DIAGNOSIS — E11.40 TYPE 2 DIABETES, CONTROLLED, WITH NEUROPATHY: Primary | ICD-10-CM

## 2023-05-08 DIAGNOSIS — I83.891 VENOUS STASIS ULCER OF RIGHT LOWER LEG WITH EDEMA OF RIGHT LOWER LEG: ICD-10-CM

## 2023-05-08 DIAGNOSIS — R60.0 VENOUS STASIS ULCER OF RIGHT LOWER LEG WITH EDEMA OF RIGHT LOWER LEG: ICD-10-CM

## 2023-05-08 DIAGNOSIS — L97.919 VENOUS STASIS ULCER OF RIGHT LOWER LEG WITH EDEMA OF RIGHT LOWER LEG: ICD-10-CM

## 2023-05-08 DIAGNOSIS — I87.2 VENOUS STASIS DERMATITIS OF RIGHT LOWER EXTREMITY: ICD-10-CM

## 2023-05-08 DIAGNOSIS — L60.0 INGROWN NAIL OF GREAT TOE OF LEFT FOOT: ICD-10-CM

## 2023-05-08 DIAGNOSIS — I83.019 VENOUS STASIS ULCER OF RIGHT LOWER LEG WITH EDEMA OF RIGHT LOWER LEG: ICD-10-CM

## 2023-05-08 PROCEDURE — 99214 PR OFFICE/OUTPT VISIT, EST, LEVL IV, 30-39 MIN: ICD-10-PCS | Mod: S$PBB,,, | Performed by: PODIATRIST

## 2023-05-08 PROCEDURE — 87070 CULTURE OTHR SPECIMN AEROBIC: CPT | Performed by: PODIATRIST

## 2023-05-08 PROCEDURE — 99213 OFFICE O/P EST LOW 20 MIN: CPT | Mod: PBBFAC,PN | Performed by: PODIATRIST

## 2023-05-08 PROCEDURE — 99999 PR PBB SHADOW E&M-EST. PATIENT-LVL III: ICD-10-PCS | Mod: PBBFAC,,, | Performed by: PODIATRIST

## 2023-05-08 PROCEDURE — 99214 OFFICE O/P EST MOD 30 MIN: CPT | Mod: S$PBB,,, | Performed by: PODIATRIST

## 2023-05-08 PROCEDURE — 99999 PR PBB SHADOW E&M-EST. PATIENT-LVL III: CPT | Mod: PBBFAC,,, | Performed by: PODIATRIST

## 2023-05-08 RX ORDER — DOXYCYCLINE 100 MG/1
100 CAPSULE ORAL 2 TIMES DAILY
COMMUNITY
Start: 2023-04-17 | End: 2023-05-08

## 2023-05-08 RX ORDER — CIPROFLOXACIN 500 MG/1
500 TABLET ORAL 2 TIMES DAILY
COMMUNITY
Start: 2023-01-25 | End: 2023-05-08

## 2023-05-08 RX ORDER — PREDNISONE 5 MG/1
20 TABLET ORAL
COMMUNITY
Start: 2023-05-01 | End: 2023-08-08

## 2023-05-08 RX ORDER — PREDNISONE 5 MG/1
TABLET ORAL
COMMUNITY
Start: 2023-05-01 | End: 2023-05-08 | Stop reason: SDUPTHER

## 2023-05-09 NOTE — PROGRESS NOTES
Subjective:       Patient ID: Angel Duque Jr. is a 82 y.o. male.    Chief Complaint: Follow-up, Skin Problem, Ingrown Toenail, and Diabetes Mellitus  Patient presents for follow up type 2 diabetes, venous insufficiency, stasis ulcer right lower extremity.  Reports home health is still coming out 3 times a week, swelling is improved but he still has one open area that seems to heal over and break open overnight over again.  Relates taken Rituxan for rheumatoid arthritis.  First infusion helped, 2nd did not.  Currently taking prednisone.  He feels both of these treatments interfere with healing.    Patient also complains of ingrown nail and points to the medial left hallux.  He does relate however applying Vicks vapor rub on a regular basis since last appointment has helped considerably in this area has just started bothering him over the last few weeks  Reports diabetes has been well controlled, glucose at home this morning was 110      Past Medical History:   Diagnosis Date    AAA (abdominal aortic aneurysm)     Arthritis     Rheumatoid & Osteoarthritis    BPH (benign prostatic hyperplasia)     CAD (coronary artery disease)     Chronic bronchitis     COPD (chronic obstructive pulmonary disease)     Depression     Diabetes mellitus, type 2     Diverticulosis     DM (diabetes mellitus)     Dyslipidemia     GERD (gastroesophageal reflux disease)     Gout     Hernia of unspecified site of abdominal cavity without mention of obstruction or gangrene     HTN (hypertension)     Hx of CABG     Hyperlipidemia     Hypothyroidism     Insomnia     Ischemic cardiomyopathy     Lower back pain     Lymphedema     Macular degeneration     MI (myocardial infarction)     HORTENSIA (obstructive sleep apnea)     Peripheral neuropathy     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     Tremor      Past Surgical History:   Procedure Laterality Date    ABDOMINAL AORTIC ANEURYSM REPAIR      ABDOMINAL HERNIA REPAIR      CARPAL TUNNEL  RELEASE Right     CORONARY ARTERY BYPASS GRAFT      hemrhoidectomy      HERNIA REPAIR      NASAL SEPTUM SURGERY      TONSILLECTOMY      TOTAL KNEE ARTHROPLASTY      TOTAL KNEE ARTHROPLASTY Bilateral      Family History   Problem Relation Age of Onset    Esophageal cancer Paternal Uncle     Cancer Paternal Uncle 69        esophageal    Esophageal cancer Paternal Grandfather     Cancer Paternal Grandfather     Colon cancer Neg Hx      Social History     Socioeconomic History    Marital status:    Tobacco Use    Smoking status: Former     Types: Cigarettes     Quit date: 1997     Years since quittin.3    Smokeless tobacco: Never   Substance and Sexual Activity    Alcohol use: No    Drug use: No       Current Outpatient Medications   Medication Sig Dispense Refill    albuterol-ipratropium (DUO-NEB) 2.5 mg-0.5 mg/3 mL nebulizer solution Inhale by mouth if needed as directed.  Indications: COPD      allopurinol (ZYLOPRIM) 300 MG tablet Take 300 mg by mouth once daily.       amLODIPine (NORVASC) 5 MG tablet       ascorbic acid, vitamin C, (VITAMIN C) 1000 MG tablet Take 1 tablet by mouth daily.  Indications: vitamin supplement      atorvastatin (LIPITOR) 80 MG tablet Take 80 mg by mouth every evening.       butalbital-acetaminophen-caffeine -40 mg (FIORICET, ESGIC) -40 mg per tablet   2 tab, Oral, q6h, PRN for headache, # 120 tab, 0 Refill(s), Pharmacy: [id number], 175, cm, 08/15/22 13:26:00 CDT, Height/Length Measured, 112, kg, 22 11:07:00 CDT, Weight Dosing      calcium carb-mag oxide-vit D3 400-167-133 mg-mg-unit Tab Take 1 tablet by mouth daily or as directed.  Indications: supplement      carbidopa-levodopa  mg (SINEMET)  mg per tablet   90 tab, 0 Refill(s)      cholecalciferol, vitamin D3, (VITAMIN D3) 50 mcg (2,000 unit) Cap capsule Take 1 capsule by mouth daily.  Indications: vitamin supplement      DICLOFENAC SODIUM (VOLTAREN TOP) Apply 1 % topically as needed  (for pain in knees).      ENTRESTO  mg per tablet Take 1 tablet by mouth 2 (two) times daily.      finasteride (PROSCAR) 5 mg tablet Take 5 mg by mouth once daily.      fluticasone propionate (FLONASE) 50 mcg/actuation nasal spray by Each Nostril route.      furosemide (LASIX) 80 MG tablet Take 1 tablet by mouth only if needed as directed.  Indications: edema      gabapentin (NEURONTIN) 600 MG tablet 600 mg.      glimepiride (AMARYL) 2 MG tablet Take 2 mg by mouth once daily.      HYDROcodone-acetaminophen (NORCO) 7.5-325 mg per tablet Take 1 tablet by mouth 3 (three) times daily as needed.      ipratropium (ATROVENT) 0.02 % nebulizer solution Take 500 mcg by nebulization 4 (four) times daily. Rescue      ipratropium (ATROVENT) 21 mcg (0.03 %) nasal spray SMARTSIG:Both Nares      levothyroxine (SYNTHROID) 88 MCG tablet Take 88 mcg by mouth before breakfast.       metFORMIN (GLUCOPHAGE) 850 MG tablet   = 1 tab, Oral, BID, *ZYDUS*., # 180 tab, 3 Refill(s), Maintenance, Pharmacy: [facility], 179, cm, 05/03/21 12:19:00 CDT, Height/Length Measured, 117.8, kg, 05/03/21 12:19:00 CDT, Weight Dosing      methocarbamoL (ROBAXIN) 500 MG Tab Take 1 tablet by mouth in the morning if needed.  Indications: muscle spasm      methocarbamoL (ROBAXIN) 750 MG Tab Take 750 mg by mouth nightly.      metoprolol succinate (TOPROL-XL) 25 MG 24 hr tablet Take 25 mg by mouth once daily.      multivit-min-FA-lycopen-lutein (CENTRUM SILVER) 0.4 mg-300 mcg- 250 mcg Tab Take 1 tablet by mouth daily.  Indications: vitamin supplement      omeprazole (PRILOSEC) 40 MG capsule Take 40 mg by mouth 2 (two) times daily.      OPW GABAPENTIN 5% LIDOCAINE HCL 5% TOPICAL CREAM 50G   1 cleve, Topical, TID, # 45 gm, 5 Refill(s), Pharmacy: [facility] #[id number], Diabetic neuropathy, 175, cm, 02/01/23 13:05:00 CST, Height/Length Measured, 112.8, kg, 01/24/23 13:34:00 CST, Weight Dosing      potassium chloride (K-TAB) 20 mEq Take 1 tablet by  mouth only if needed as directed, with Furosemide.  Indications: potassium levels      predniSONE (DELTASONE) 5 MG tablet 20 mg.      ranibizumab (LUCENTIS) 0.3 mg/0.05 mL Soln Inject as directed.  Indications: macular degeneration/eye condition      ranolazine (RANEXA) 1,000 mg Tb12 Take 1,000 mg by mouth 2 (two) times daily.      riTUXimab (RITUXAN) 10 mg/mL injection Inject as directed.  Indications: rheumatoid arthritis      senna-docusate 8.6-50 mg (PERICOLACE) 8.6-50 mg per tablet Take 1 tablet by mouth if needed as directed.  Indications: constipation      sertraline (ZOLOFT) 100 MG tablet   = 1 tab, Oral, Daily, # 90 tab, 3 Refill(s), Maintenance, Pharmacy: [facility], 179, cm, 21 14:52:00 CDT, Height/Length Measured, 117.02, kg, 21 14:52:00 CDT, Weight Dosing      silodosin (RAPAFLO) 8 mg Cap capsule Take 8 mg by mouth once daily.      sodium chloride 0.9% SolP 500 mL with riTUXimab 10 mg/mL Conc 375 mg/m2       STIOLTO RESPIMAT 2.5-2.5 mcg/actuation Mist SMARTSI Puff(s) Via Inhaler Daily      trazodone (DESYREL) 150 MG tablet Take 150 mg by mouth every evening.      vit C,A-Ko-iadwd-lutein-zeaxan (PRESERVISION AREDS-2) 250-90-40-1 mg Cap Take 1 capsule by mouth twice daily.  Indications: vitamin supplement      vitamin E 400 UNIT capsule Take 1 capsule by mouth daily.  Indications: vitamin supplement      nitroGLYCERIN 0.4 MG/DOSE TL SPRY (NITROLINGUAL) 400 mcg/spray spray Use as directed as needed.  Indications: angina/acute chest pain       No current facility-administered medications for this visit.     Review of patient's allergies indicates:   Allergen Reactions    Pregabalin      Other reaction(s): Depression    Flomax [tamsulosin]      Made extremity weak    Roflumilast     Sulfa (sulfonamide antibiotics)     Sulfa dyne     Sulfur      Old type not new types of sulfur    Latex Itching    Tegretol [carbamazepine] Itching and Rash       Review of Systems   HENT:   "Negative for congestion.    Respiratory:  Negative for cough.    Cardiovascular:  Positive for leg swelling.        Right lower leg edema, compression hose left   Endocrine:        >10 yr DM2   Musculoskeletal:  Positive for gait problem.        Walker, limited mobility    Skin:  Positive for wound.        Right lower leg   All other systems reviewed and are negative.    Objective:      Vitals:    05/08/23 0933   BP: (!) 98/55   Pulse: 63   Resp: 16   Weight: 106.6 kg (235 lb)   Height: 5' 10" (1.778 m)     Physical Exam  Vitals and nursing note reviewed.   Constitutional:       General: He is not in acute distress.     Appearance: Normal appearance.   Cardiovascular:      Pulses:           Dorsalis pedis pulses are 1+ on the right side and 1+ on the left side.        Posterior tibial pulses are 1+ on the right side and 1+ on the left side.   Pulmonary:      Effort: Pulmonary effort is normal.   Musculoskeletal:      Right lower leg: Edema present.      Right foot: Decreased range of motion (Arthritic and degenerative changes bilateral feet limited range of motion 1st MPJ and midfoot bilateral). Bunion present.      Left foot: Decreased range of motion.   Feet:      Right foot:      Skin integrity: No erythema (tender ingrown nail medial left hallux, no infection).      Toenail Condition: Right toenails are abnormally thick.      Left foot:      Toenail Condition: Left toenails are abnormally thick and ingrown.   Skin:     General: Skin is dry.      Capillary Refill: Capillary refill takes 2 to 3 seconds.      Findings: Erythema present.      Comments: superficial stasis ulcer anterior right lower leg dry, improved, granular base, mild surrounding erythema, no calor. Plaques right lower leg, dry skin dermatitis    Neurological:      Mental Status: He is alert.   Psychiatric:         Mood and Affect: Mood normal.         Behavior: Behavior normal.         Thought Content: Thought content normal.         Judgment: " Judgment normal.                             Assessment:       1. Type 2 diabetes, controlled, with neuropathy    2. Venous stasis ulcer of right lower leg with edema of right lower leg    3. Edema of right lower extremity    4. Ingrown nail of great toe of left foot    5. Venous stasis dermatitis of right lower extremity        Plan:         C&S ULCER RLE  CONTINUE HOME HEALTH 3 x weekly      Advised patient treatments definitely can suppressed immune system and affect healing, however compared to our last visit 3 months ago his leg has improved significantly.  Explained tension on the skin, condition of fragile scarred thin skin puts him at risk for recurrence.  We discussed condition of damaged skin and how it is difficult to treat this since his legs are constantly wrapped.  Advised patient medication, such as a Silvadene cream, could be applied each evening but compression would need to be removed, medication applied and allow to thoroughly soak into the skin and dry all night before applying compression hose.  He is not at the point where he can can discontinue weekly dressing changes and start daily support/compression hose  Reviewed with patient concern regarding infection pertaining to the area which is not healing, area was cultured today and advised patient we will contact him with the results of an oral antibiotic is needed.  Patient had questions regarding tissue substitute and we will follow-up with this once culture results are obtained  In the meantime due to significant improvement over the last 3 months will continue home health 3 times weekly cleaning the wound, applying iodine, silver alginate and a compression of the right lower leg.  Continue open toed compression hose left lower extremity  Wound care today right lower leg cleansed with gauze to remove superficial debris.  Base of ulcer is granular, healthy, no tracking or undermining, no maceration.  Iodine,  Silver alginate, gauze,  stockingette, coban lower extremity compression dressing applied  Reviewed diabetic education, potential complications especially regarding wound right lower leg  Reviewed signs of infection to monitor for regarding chronic ingrown nail   Ingrown nail removed/debrided medial left hallux.  Triple antibiotic ointment, gauze, coban applied. Patient is not a candidate for nail avulsion at this time, no infection.  Instructed patient to leave dressing intact until the morning, if sore apply small amount of antibiotic ointment and soft dressing daily until any remaining discomfort has resolved, should be pain-free in a few days.    Once pain-free resume daily application of Vicks vapor rub  Reviewed need for daily foot checks and instructed patient to contact the office with any area of redness or swelling which has not improved within 3 days.  Patient was in understanding and agreement with treatment plan.  I counseled the patient on their conditions, implications and medical management.  Instructed patient to contact the office with any changes, questions, concerns, worsening of symptoms.   Total face to face time 30 minutes, exam, assessment, treatment, discussion, additional time for review of chart prior to and following appointment and visit documentation, consultation and coordination of care.    Follow up 3 months, or sooner if any changes    This note was created using M*Modal voice recognition software that occasionally misinterpreted phrases or words.

## 2023-05-11 LAB — BACTERIA SPEC AEROBE CULT: NORMAL

## 2023-05-11 NOTE — PROGRESS NOTES
Advise patient no growth on culture right lower leg ulcer. Continue control of swelling. Call with any changes

## 2023-05-12 ENCOUNTER — TELEPHONE (OUTPATIENT)
Dept: PODIATRY | Facility: CLINIC | Age: 83
End: 2023-05-12
Payer: MEDICARE

## 2023-05-19 ENCOUNTER — EXTERNAL HOME HEALTH (OUTPATIENT)
Dept: HOME HEALTH SERVICES | Facility: HOSPITAL | Age: 83
End: 2023-05-19
Payer: MEDICARE

## 2023-05-26 ENCOUNTER — DOCUMENT SCAN (OUTPATIENT)
Dept: HOME HEALTH SERVICES | Facility: HOSPITAL | Age: 83
End: 2023-05-26
Payer: MEDICARE

## 2023-06-08 PROCEDURE — G0179 PR HOME HEALTH MD RECERTIFICATION: ICD-10-PCS | Mod: ,,, | Performed by: PODIATRIST

## 2023-06-08 PROCEDURE — G0179 MD RECERTIFICATION HHA PT: HCPCS | Mod: ,,, | Performed by: PODIATRIST

## 2023-06-13 ENCOUNTER — EXTERNAL HOME HEALTH (OUTPATIENT)
Dept: HOME HEALTH SERVICES | Facility: HOSPITAL | Age: 83
End: 2023-06-13
Payer: MEDICARE

## 2023-06-14 ENCOUNTER — TELEPHONE (OUTPATIENT)
Dept: PODIATRY | Facility: CLINIC | Age: 83
End: 2023-06-14
Payer: MEDICARE

## 2023-06-14 NOTE — TELEPHONE ENCOUNTER
Home health([facility])  nurse Danitza contacted clinic asking if you wanted to send an order for in home wound care using [facility] wound care. [facility] and [facility] have partnered up to provide in home services. She said the wound has gotten a little bigger and nothing is really changing as far as the appearance of the wound.

## 2023-06-14 NOTE — TELEPHONE ENCOUNTER
----- Message from Alejandra Contreras sent at 6/14/2023  1:54 PM CDT -----  Contact: Danitza  Type:  Needs Medical Advice    Who Called: Danitza from [facility]  Would the patient rather a call back or a response via [facility]? call  Best Call Back Number:   Additional Information: caller is wanting to speak to nurse concerning his wound care. Please advise and thank you.

## 2023-06-15 DIAGNOSIS — I83.891 VENOUS STASIS ULCER OF RIGHT LOWER LEG WITH EDEMA OF RIGHT LOWER LEG: Primary | ICD-10-CM

## 2023-06-15 DIAGNOSIS — I83.019 VENOUS STASIS ULCER OF RIGHT LOWER LEG WITH EDEMA OF RIGHT LOWER LEG: Primary | ICD-10-CM

## 2023-06-15 DIAGNOSIS — E11.40 TYPE 2 DIABETES, CONTROLLED, WITH NEUROPATHY: ICD-10-CM

## 2023-06-15 DIAGNOSIS — R60.0 EDEMA OF RIGHT LOWER EXTREMITY: ICD-10-CM

## 2023-06-15 DIAGNOSIS — R60.0 VENOUS STASIS ULCER OF RIGHT LOWER LEG WITH EDEMA OF RIGHT LOWER LEG: Primary | ICD-10-CM

## 2023-06-15 DIAGNOSIS — L97.919 VENOUS STASIS ULCER OF RIGHT LOWER LEG WITH EDEMA OF RIGHT LOWER LEG: Primary | ICD-10-CM

## 2023-06-15 NOTE — TELEPHONE ENCOUNTER
New HH order is in, [facility] wound care is not in our system, can you please contact [facility] for #, to route this with order to the proper fax #? Thank you

## 2023-06-15 NOTE — TELEPHONE ENCOUNTER
----- Message from Marielos Sood sent at 6/15/2023  2:06 PM CDT -----  Contact: Danitza [phone]  Type:  Patient Returning Call    Who Called:  Danitza navarrete/ [facility] HUMPHREY  Who Left Message for Patient:  Emi   Does the patient know what this is regarding?:  Yes   Best Call Back Number:  [phone]    Additional Information:  Danitza stated she was sorry she missed your call. She ask if you can send wound care order to [facility] fax [phone]  No Need to call back

## 2023-06-15 NOTE — TELEPHONE ENCOUNTER
Faxed referral to [facility]. They said that they would communicate and send referral to [facility].

## 2023-06-19 ENCOUNTER — DOCUMENT SCAN (OUTPATIENT)
Dept: HOME HEALTH SERVICES | Facility: HOSPITAL | Age: 83
End: 2023-06-19
Payer: MEDICARE

## 2023-08-09 ENCOUNTER — OFFICE VISIT (OUTPATIENT)
Dept: PODIATRY | Facility: CLINIC | Age: 83
End: 2023-08-09
Payer: MEDICARE

## 2023-08-09 VITALS
SYSTOLIC BLOOD PRESSURE: 112 MMHG | HEART RATE: 59 BPM | RESPIRATION RATE: 18 BRPM | HEIGHT: 70 IN | WEIGHT: 236 LBS | BODY MASS INDEX: 33.79 KG/M2 | DIASTOLIC BLOOD PRESSURE: 56 MMHG

## 2023-08-09 DIAGNOSIS — R60.0 EDEMA OF RIGHT LOWER EXTREMITY: ICD-10-CM

## 2023-08-09 DIAGNOSIS — L60.0 INGROWN NAIL OF GREAT TOE OF LEFT FOOT: ICD-10-CM

## 2023-08-09 DIAGNOSIS — I87.2 VENOUS INSUFFICIENCY OF RIGHT LOWER EXTREMITY: ICD-10-CM

## 2023-08-09 DIAGNOSIS — E11.40 TYPE 2 DIABETES, CONTROLLED, WITH NEUROPATHY: Primary | ICD-10-CM

## 2023-08-09 PROCEDURE — 99999 PR PBB SHADOW E&M-EST. PATIENT-LVL V: ICD-10-PCS | Mod: PBBFAC,,, | Performed by: PODIATRIST

## 2023-08-09 PROCEDURE — 99999 PR PBB SHADOW E&M-EST. PATIENT-LVL V: CPT | Mod: PBBFAC,,, | Performed by: PODIATRIST

## 2023-08-09 PROCEDURE — 99215 OFFICE O/P EST HI 40 MIN: CPT | Mod: PBBFAC,PN | Performed by: PODIATRIST

## 2023-08-09 PROCEDURE — 99213 PR OFFICE/OUTPT VISIT, EST, LEVL III, 20-29 MIN: ICD-10-PCS | Mod: S$PBB,,, | Performed by: PODIATRIST

## 2023-08-09 PROCEDURE — 99213 OFFICE O/P EST LOW 20 MIN: CPT | Mod: S$PBB,,, | Performed by: PODIATRIST

## 2023-08-09 RX ORDER — AZELASTINE 1 MG/ML
2 SPRAY, METERED NASAL
COMMUNITY
Start: 2023-07-31

## 2023-08-09 RX ORDER — METHYLPREDNISOLONE 4 MG/1
TABLET ORAL
COMMUNITY
Start: 2023-07-31 | End: 2023-08-09

## 2023-08-09 RX ORDER — SENNOSIDES 25 MG/1
TABLET, FILM COATED ORAL
COMMUNITY
Start: 2023-08-01

## 2023-08-09 RX ORDER — PREDNISONE 5 MG/1
TABLET ORAL
COMMUNITY
Start: 2023-08-08 | End: 2023-11-08

## 2023-08-09 NOTE — PROGRESS NOTES
Subjective:       Patient ID: Angel Duque Jr. is a 82 y.o. male.    Chief Complaint: Follow-up and Diabetes Mellitus  Patient presents for follow up type 2 diabetes, venous insufficiency.  Confirms home health is coming out 3 times a week for wound care and wrap right lower leg, which was ordered through our office.  Home health wound care is coming in once a week, which was most likely ordered by PCP.  Patient presents with information today regarding a chorion membrane product by [employer] called novachor which wound care has been applying.  He presents with pictures on his phone from last visit to today regarding the wound of the right lower leg.  He feels it is improving.  Has usual pain in his feet and legs.   Reports diabetes has been very well controlled with an A1c 6.0, reports glucose was 112 yesterday  Has been seeing Dr. Nunez for RA which he was diagnosed with not too long ago.  Experiences a lot of joint pain.  Pain level 7/10      Past Medical History:   Diagnosis Date    AAA (abdominal aortic aneurysm)     Arthritis     Rheumatoid & Osteoarthritis    BPH (benign prostatic hyperplasia)     CAD (coronary artery disease)     Chronic bronchitis     COPD (chronic obstructive pulmonary disease)     Depression     Diabetes mellitus, type 2     Diverticulosis     DM (diabetes mellitus)     Dyslipidemia     GERD (gastroesophageal reflux disease)     Gout     Hernia of unspecified site of abdominal cavity without mention of obstruction or gangrene     HTN (hypertension)     Hx of CABG     Hyperlipidemia     Hypothyroidism     Insomnia     Ischemic cardiomyopathy     Lower back pain     Lymphedema     Macular degeneration     MI (myocardial infarction)     HORTENSIA (obstructive sleep apnea)     Peripheral neuropathy     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     Tremor      Past Surgical History:   Procedure Laterality Date    ABDOMINAL AORTIC ANEURYSM REPAIR      ABDOMINAL HERNIA REPAIR       CARPAL TUNNEL RELEASE Right     CORONARY ARTERY BYPASS GRAFT      hemrhoidectomy      HERNIA REPAIR      NASAL SEPTUM SURGERY      TONSILLECTOMY      TOTAL KNEE ARTHROPLASTY      TOTAL KNEE ARTHROPLASTY Bilateral      Family History   Problem Relation Age of Onset    Esophageal cancer Paternal Uncle     Cancer Paternal Uncle 69        esophageal    Esophageal cancer Paternal Grandfather     Cancer Paternal Grandfather     Colon cancer Neg Hx      Social History     Socioeconomic History    Marital status:    Tobacco Use    Smoking status: Former     Current packs/day: 0.00     Types: Cigarettes     Quit date: 1997     Years since quittin.5    Smokeless tobacco: Never   Substance and Sexual Activity    Alcohol use: No    Drug use: No       Current Outpatient Medications   Medication Sig Dispense Refill    albuterol-ipratropium (DUO-NEB) 2.5 mg-0.5 mg/3 mL nebulizer solution Inhale by mouth if needed as directed.  Indications: COPD      allopurinol (ZYLOPRIM) 300 MG tablet Take 300 mg by mouth once daily.       amLODIPine (NORVASC) 5 MG tablet       ascorbic acid, vitamin C, (VITAMIN C) 1000 MG tablet Take 1 tablet by mouth daily.  Indications: vitamin supplement      atorvastatin (LIPITOR) 80 MG tablet Take 80 mg by mouth every evening.       azelastine (ASTELIN) 137 mcg (0.1 %) nasal spray 2 sprays.      butalbital-acetaminophen-caffeine -40 mg (FIORICET, ESGIC) -40 mg per tablet   2 tab, Oral, q6h, PRN for headache, # 120 tab, 0 Refill(s), Pharmacy: [id number], 175, cm, 08/15/22 13:26:00 CDT, Height/Length Measured, 112, kg, 22 11:07:00 CDT, Weight Dosing      calcium carb-mag oxide-vit D3 400-167-133 mg-mg-unit Tab Take 1 tablet by mouth daily or as directed.  Indications: supplement      carbidopa-levodopa  mg (SINEMET)  mg per tablet   90 tab, 0 Refill(s)      cholecalciferol, vitamin D3, (VITAMIN D3) 50 mcg (2,000 unit) Cap capsule Take 1 capsule by mouth  daily.  Indications: vitamin supplement      DICLOFENAC SODIUM (VOLTAREN TOP) Apply 1 % topically as needed (for pain in knees).      ENTRESTO  mg per tablet Take 1 tablet by mouth 2 (two) times daily.      finasteride (PROSCAR) 5 mg tablet Take 5 mg by mouth once daily.      fluticasone propionate (FLONASE) 50 mcg/actuation nasal spray by Each Nostril route.      furosemide (LASIX) 80 MG tablet Take 1 tablet by mouth only if needed as directed.  Indications: edema      gabapentin (NEURONTIN) 600 MG tablet 600 mg.      glimepiride (AMARYL) 2 MG tablet Take 2 mg by mouth once daily.      HYDROcodone-acetaminophen (NORCO) 7.5-325 mg per tablet Take 1 tablet by mouth 3 (three) times daily as needed.      ipratropium (ATROVENT) 0.02 % nebulizer solution Take 500 mcg by nebulization 4 (four) times daily. Rescue      ipratropium (ATROVENT) 21 mcg (0.03 %) nasal spray SMARTSIG:Both Nares      levothyroxine (SYNTHROID) 88 MCG tablet Take 88 mcg by mouth before breakfast.       LIDOcaine 5 % Crea   1 cleve, Topical, TID, # 45 gm, 5 Refill(s), [facility] Di[facility], 178, cm, 08/01/23 13:02:00 CDT, Height/Length Measured, 113, kg, 07/31/23 12:14:00 CDT, Weight Dosing      metFORMIN (GLUCOPHAGE) 850 MG tablet   = 1 tab, Oral, BID, *ZYDUS*., # 180 tab, 3 Refill(s), Maintenance, Pharmacy: [facility], 179, cm, 05/03/21 12:19:00 CDT, Height/Length Measured, 117.8, kg, 05/03/21 12:19:00 CDT, Weight Dosing      methocarbamoL (ROBAXIN) 500 MG Tab Take 1 tablet by mouth in the morning if needed.  Indications: muscle spasm      methocarbamoL (ROBAXIN) 750 MG Tab Take 750 mg by mouth nightly.      metoprolol succinate (TOPROL-XL) 25 MG 24 hr tablet Take 25 mg by mouth once daily.      multivit-min-FA-lycopen-lutein (CENTRUM SILVER) 0.4 mg-300 mcg- 250 mcg Tab Take 1 tablet by mouth daily.  Indications: vitamin supplement      nitroGLYCERIN 0.4 MG/DOSE TL SPRY  (NITROLINGUAL) 400 mcg/spray spray Use as directed as needed.  Indications: angina/acute chest pain      omeprazole (PRILOSEC) 40 MG capsule Take 40 mg by mouth 2 (two) times daily.      OPW GABAPENTIN 5% LIDOCAINE HCL 5% TOPICAL CREAM 50G   1 cleve, Topical, TID, # 45 gm, 5 Refill(s), Pharmacy: [id number], Diabetic neuropathy, 175, cm, 23 13:05:00 CST, Height/Length Measured, 112.8, kg, 23 13:34:00 CST, Weight Dosing      potassium chloride (K-TAB) 20 mEq Take 1 tablet by mouth only if needed as directed, with Furosemide.  Indications: potassium levels      ranibizumab (LUCENTIS) 0.3 mg/0.05 mL Soln Inject as directed.  Indications: macular degeneration/eye condition      ranolazine (RANEXA) 1,000 mg Tb12 Take 1,000 mg by mouth 2 (two) times daily.      riTUXimab (RITUXAN) 10 mg/mL injection Inject as directed.  Indications: rheumatoid arthritis      sertraline (ZOLOFT) 100 MG tablet   = 1 tab, Oral, Daily, # 90 tab, 3 Refill(s), Maintenance, Pharmacy: [facility], 179, cm, 21 14:52:00 CDT, Height/Length Measured, 117.02, kg, 21 14:52:00 CDT, Weight Dosing      silodosin (RAPAFLO) 8 mg Cap capsule Take 8 mg by mouth once daily.      sodium chloride 0.9% SolP 500 mL with riTUXimab 10 mg/mL Conc 375 mg/m2       STIOLTO RESPIMAT 2.5-2.5 mcg/actuation Mist SMARTSI Puff(s) Via Inhaler Daily      trazodone (DESYREL) 150 MG tablet Take 150 mg by mouth every evening.      vit C,U-Mf-jekch-lutein-zeaxan (PRESERVISION AREDS-2) 250-90-40-1 mg Cap Take 1 capsule by mouth twice daily.  Indications: vitamin supplement      vitamin E 400 UNIT capsule Take 1 capsule by mouth daily.  Indications: vitamin supplement      methylPREDNISolone (MEDROL DOSEPACK) 4 mg tablet Take by mouth.      predniSONE (DELTASONE) 5 MG tablet Take by mouth.       No current facility-administered medications for this visit.     Review of patient's allergies indicates:   Allergen Reactions    Pregabalin   "    Other reaction(s): Depression    Flomax [tamsulosin]      Made extremity weak    Roflumilast     Sulfa (sulfonamide antibiotics)     Sulfa dyne     Sulfur      Old type not new types of sulfur    Latex Itching    Tegretol [carbamazepine] Itching and Rash       Review of Systems   Cardiovascular:  Positive for leg swelling.        Majority of edema controlled with compression wrap right leg open toed compression hose left lower leg   Endocrine:        >10 yr DM2   Musculoskeletal:  Positive for gait problem.        Walker, limited mobility    Skin:  Positive for wound.        Right lower leg   All other systems reviewed and are negative.      Objective:      Vitals:    08/09/23 1010   BP: (!) 112/56   Pulse: (!) 59   Resp: 18   Weight: 107 kg (236 lb)   Height: 5' 10" (1.778 m)     Physical Exam  Vitals and nursing note reviewed.   Constitutional:       General: He is not in acute distress.     Appearance: Normal appearance.   Cardiovascular:      Pulses:           Dorsalis pedis pulses are 1+ on the right side and 1+ on the left side.        Posterior tibial pulses are 1+ on the right side and 1+ on the left side.   Pulmonary:      Effort: Pulmonary effort is normal.   Musculoskeletal:      Right lower leg: Edema present.      Left lower leg: Edema present.      Right foot: Decreased range of motion (Arthritic and degenerative changes bilateral feet limited range of motion 1st MPJ and midfoot bilateral). Bunion present.      Left foot: Decreased range of motion.   Feet:      Right foot:      Skin integrity: No erythema.      Toenail Condition: Right toenails are abnormally thick.      Left foot:      Skin integrity: No erythema (Pain, no infection due to ingrown nail lateral left hallux).      Toenail Condition: Left toenails are abnormally thick and ingrown.   Skin:     Capillary Refill: Capillary refill takes 2 to 3 seconds.      Comments: Venous insufficiency right greater than left lower extremity.  " Compression wrap clean dry and left intact right lower leg   Neurological:      Mental Status: He is alert.   Psychiatric:         Behavior: Behavior normal.         Thought Content: Thought content normal.                             Assessment:       1. Type 2 diabetes, controlled, with neuropathy    2. Venous insufficiency of right lower extremity    3. Edema of right lower extremity    4. Ingrown nail of great toe of left foot          Plan:         Compared to previous pictures and patient's chart to the photos he presents with today stasis ulcer in the right lower leg appears more macerated but remains superficial  Will continue home health 3 times weekly for wound care and compression treatment  Did discuss venous insufficiency with patient and he agrees that there is damage to the veins right lower extremity and soon as compression is stopped swelling returns.  Compression hose typically have not been as effective on his right lower leg as they have been on his left.  Right lower leg wound was not inspected at patient's request since it was recently bandaged and dressing is clean dry intact we reviewed signs of infection to monitor for.  With any concerns contact our office and will put an order in for home health to culture the wound  Reviewed diabetic education, potential complications especially regarding wound right lower leg  Reviewed signs of infection to monitor for regarding chronic ingrown nail   Ingrown nail removed/debrided later left hallux.  Triple antibiotic ointment, gauze, coban applied.    Recommended application of Vicks vapor rub to this area every day to try to prevent recurrence  Reviewed need for daily foot checks and instructed patient to contact the office with any area of redness or swelling which has not improved within 3 days.  Patient was in understanding and agreement with treatment plan.  I counseled the patient on their conditions, implications and medical  management.  Instructed patient to contact the office with any changes, questions, concerns, worsening of symptoms.   Total face to face time 20 minutes, exam, assessment, treatment, discussion, additional time for review of chart prior to and following appointment and visit documentation, consultation and coordination of care.    Follow up 3 months, or sooner if any changes    This note was created using [id number] voice recognition software that occasionally misinterpreted phrases or words.

## 2023-08-15 ENCOUNTER — TELEPHONE (OUTPATIENT)
Dept: PODIATRY | Facility: CLINIC | Age: 83
End: 2023-08-15
Payer: MEDICARE

## 2023-08-15 NOTE — TELEPHONE ENCOUNTER
----- Message from Marielos Sood sent at 8/15/2023 11:37 AM CDT -----  Contact: Aydee [phone]  Type: Needs Medical Advice  Who Called:  Aydee navarrete/ yifan HUMPHREY     Best Call Back Number: [phone]  Additional Information: Aydee requesting a call back about an order she received. She is having a hard time contacting the provider pt was referred to. Pls call back and advise

## 2023-11-08 ENCOUNTER — OFFICE VISIT (OUTPATIENT)
Dept: PODIATRY | Facility: CLINIC | Age: 83
End: 2023-11-08
Payer: MEDICARE

## 2023-11-08 VITALS
RESPIRATION RATE: 16 BRPM | WEIGHT: 227.5 LBS | HEART RATE: 78 BPM | BODY MASS INDEX: 32.57 KG/M2 | HEIGHT: 70 IN | SYSTOLIC BLOOD PRESSURE: 109 MMHG | DIASTOLIC BLOOD PRESSURE: 61 MMHG

## 2023-11-08 DIAGNOSIS — L60.0 INGROWN NAIL OF GREAT TOE OF RIGHT FOOT: ICD-10-CM

## 2023-11-08 DIAGNOSIS — I87.2 VENOUS INSUFFICIENCY OF RIGHT LOWER EXTREMITY: ICD-10-CM

## 2023-11-08 DIAGNOSIS — L60.0 INGROWN NAIL OF GREAT TOE OF LEFT FOOT: Primary | ICD-10-CM

## 2023-11-08 DIAGNOSIS — E11.40 TYPE 2 DIABETES, CONTROLLED, WITH NEUROPATHY: ICD-10-CM

## 2023-11-08 DIAGNOSIS — B35.1 ONYCHOMYCOSIS OF TOENAIL: ICD-10-CM

## 2023-11-08 PROCEDURE — 99999 PR PBB SHADOW E&M-EST. PATIENT-LVL V: CPT | Mod: PBBFAC,,, | Performed by: PODIATRIST

## 2023-11-08 PROCEDURE — 99214 PR OFFICE/OUTPT VISIT, EST, LEVL IV, 30-39 MIN: ICD-10-PCS | Mod: S$PBB,,, | Performed by: PODIATRIST

## 2023-11-08 PROCEDURE — 99999 PR PBB SHADOW E&M-EST. PATIENT-LVL V: ICD-10-PCS | Mod: PBBFAC,,, | Performed by: PODIATRIST

## 2023-11-08 PROCEDURE — 99214 OFFICE O/P EST MOD 30 MIN: CPT | Mod: S$PBB,,, | Performed by: PODIATRIST

## 2023-11-08 PROCEDURE — 99215 OFFICE O/P EST HI 40 MIN: CPT | Mod: PBBFAC,PN | Performed by: PODIATRIST

## 2023-11-08 RX ORDER — DOXYCYCLINE 100 MG/1
100 CAPSULE ORAL 2 TIMES DAILY
COMMUNITY
Start: 2023-10-26 | End: 2024-02-21

## 2023-11-08 RX ORDER — LEVOFLOXACIN 500 MG/1
500 TABLET, FILM COATED ORAL
COMMUNITY
Start: 2023-10-30 | End: 2024-02-21

## 2023-11-08 RX ORDER — LEVOFLOXACIN 500 MG/1
TABLET, FILM COATED ORAL
COMMUNITY
Start: 2023-10-30 | End: 2024-02-21

## 2023-11-08 NOTE — PROGRESS NOTES
Subjective:       Patient ID: Angel Duque Jr. is a 83 y.o. male.    Chief Complaint: Follow-up, Ingrown Toenail, Foot Pain, Diabetes Mellitus, and Foot Swelling  Patient presents for follow up type 2 diabetes, venous insufficiency.  Of pain both big toes due to ingrown nails, denies redness, swelling or drainage.  Pain level 7/10  He is at a stasis ulcer due to venous insufficiency right lower leg most of this year.  Wound care applied 10 treatments of skin substitute, once a week times 10 weeks, started in August.  States it is improved but still a small opening present and some surrounding weeping, has a bandage on today with stockinette, clean dry.  Home health orders most likely through PCP as I have not received any recently  Patient relates diabetes has been doing very well with glucose 103 this morning  States he is doing better after recovering from open gallbladder surgery.  Has a wound with some drainage from this area as well which home health is treating also.      Past Medical History:   Diagnosis Date    AAA (abdominal aortic aneurysm)     Arthritis     Rheumatoid & Osteoarthritis    BPH (benign prostatic hyperplasia)     CAD (coronary artery disease)     Chronic bronchitis     COPD (chronic obstructive pulmonary disease)     Depression     Diabetes mellitus, type 2     Diverticulosis     DM (diabetes mellitus)     Dyslipidemia     GERD (gastroesophageal reflux disease)     Gout     Hernia of unspecified site of abdominal cavity without mention of obstruction or gangrene     HTN (hypertension)     Hx of CABG     Hyperlipidemia     Hypothyroidism     Insomnia     Ischemic cardiomyopathy     Lower back pain     Lymphedema     Macular degeneration     MI (myocardial infarction)     HORTENSIA (obstructive sleep apnea)     Peripheral neuropathy     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     Tremor      Past Surgical History:   Procedure Laterality Date    ABDOMINAL AORTIC ANEURYSM REPAIR       ABDOMINAL HERNIA REPAIR      CARPAL TUNNEL RELEASE Right     CORONARY ARTERY BYPASS GRAFT      hemrhoidectomy      HERNIA REPAIR      NASAL SEPTUM SURGERY      TONSILLECTOMY      TOTAL KNEE ARTHROPLASTY      TOTAL KNEE ARTHROPLASTY Bilateral      Family History   Problem Relation Age of Onset    Esophageal cancer Paternal Uncle     Cancer Paternal Uncle 69        esophageal    Esophageal cancer Paternal Grandfather     Cancer Paternal Grandfather     Colon cancer Neg Hx      Social History     Socioeconomic History    Marital status:    Tobacco Use    Smoking status: Former     Current packs/day: 0.00     Types: Cigarettes     Quit date: 1997     Years since quittin.8    Smokeless tobacco: Never   Substance and Sexual Activity    Alcohol use: No    Drug use: No       Current Outpatient Medications   Medication Sig Dispense Refill    albuterol-ipratropium (DUO-NEB) 2.5 mg-0.5 mg/3 mL nebulizer solution Inhale by mouth if needed as directed.  Indications: COPD      allopurinol (ZYLOPRIM) 300 MG tablet Take 300 mg by mouth once daily.       amLODIPine (NORVASC) 5 MG tablet       ascorbic acid, vitamin C, (VITAMIN C) 1000 MG tablet Take 1 tablet by mouth daily.  Indications: vitamin supplement      atorvastatin (LIPITOR) 80 MG tablet Take 80 mg by mouth every evening.       azelastine (ASTELIN) 137 mcg (0.1 %) nasal spray 2 sprays.      butalbital-acetaminophen-caffeine -40 mg (FIORICET, ESGIC) -40 mg per tablet   2 tab, Oral, q6h, PRN for headache, # 120 tab, 0 Refill(s), [facility]acy: [id number], 175, cm, 08/15/22 13:26:00 CDT, Height/Length Measured, 112, kg, 22 11:07:00 CDT, Weight Dosing      calcium carb-mag oxide-vit D3 400-167-133 mg-mg-unit Tab Take 1 tablet by mouth daily or as directed.  Indications: supplement      carbidopa-levodopa  mg (SINEMET)  mg per tablet   90 tab, 0 Refill(s)      cholecalciferol, vitamin D3, (VITAMIN D3) 50 mcg (2,000 unit) Cap  capsule Take 1 capsule by mouth daily.  Indications: vitamin supplement      DICLOFENAC SODIUM (VOLTAREN TOP) Apply 1 % topically as needed (for pain in knees).      ENTRESTO  mg per tablet Take 1 tablet by mouth 2 (two) times daily.      finasteride (PROSCAR) 5 mg tablet Take 5 mg by mouth once daily.      fluticasone propionate (FLONASE) 50 mcg/actuation nasal spray by Each Nostril route.      furosemide (LASIX) 80 MG tablet Take 1 tablet by mouth only if needed as directed.  Indications: edema      gabapentin (NEURONTIN) 600 MG tablet 600 mg.      glimepiride (AMARYL) 2 MG tablet Take 2 mg by mouth once daily.      HYDROcodone-acetaminophen (NORCO) 7.5-325 mg per tablet Take 1 tablet by mouth 3 (three) times daily as needed.      ipratropium (ATROVENT) 0.02 % nebulizer solution Take 500 mcg by nebulization 4 (four) times daily. Rescue      ipratropium (ATROVENT) 21 mcg (0.03 %) nasal spray SMARTSIG:Both Nares      levoFLOXacin (LEVAQUIN) 500 MG tablet       levoFLOXacin (LEVAQUIN) 500 MG tablet Take 500 mg by mouth.      levothyroxine (SYNTHROID) 88 MCG tablet Take 88 mcg by mouth before breakfast.       LIDOcaine 5 % Crea   1 cleve, Topical, TID, # 45 gm, 5 Refill(s), [facility] Di[facility], 178, cm, 08/01/23 13:02:00 CDT, Height/Length Measured, 113, kg, 07/31/23 12:14:00 CDT, Weight Dosing      metFORMIN (GLUCOPHAGE) 850 MG tablet   = 1 tab, Oral, BID, *ZYDUS*., # 180 tab, 3 Refill(s), Maintenance, Pharmacy: [facility], 179, cm, 05/03/21 12:19:00 CDT, Height/Length Measured, 117.8, kg, 05/03/21 12:19:00 CDT, Weight Dosing      metoprolol succinate (TOPROL-XL) 25 MG 24 hr tablet Take 25 mg by mouth once daily.      multivit-min-FA-lycopen-lutein (CENTRUM SILVER) 0.4 mg-300 mcg- 250 mcg Tab Take 1 tablet by mouth daily.  Indications: vitamin supplement      omeprazole (PRILOSEC) 40 MG capsule Take 40 mg by mouth 2 (two) times daily.      OPW  GABAPENTIN 5% LIDOCAINE HCL 5% TOPICAL CREAM 50G   1 cleve, Topical, TID, # 45 gm, 5 Refill(s), Pharmacy: [facility] #[id number], Diabetic neuropathy, 175, cm, 23 13:05:00 CST, Height/Length Measured, 112.8, kg, 23 13:34:00 CST, Weight Dosing      potassium chloride (K-TAB) 20 mEq Take 1 tablet by mouth only if needed as directed, with Furosemide.  Indications: potassium levels      ranibizumab (LUCENTIS) 0.3 mg/0.05 mL Soln Inject as directed.  Indications: macular degeneration/eye condition      ranolazine (RANEXA) 1,000 mg Tb12 Take 1,000 mg by mouth 2 (two) times daily.      sertraline (ZOLOFT) 100 MG tablet   = 1 tab, Oral, Daily, # 90 tab, 3 Refill(s), Maintenance, Pharmacy: [facility], 179, cm, 21 14:52:00 CDT, Height/Length Measured, 117.02, kg, 21 14:52:00 CDT, Weight Dosing      silodosin (RAPAFLO) 8 mg Cap capsule Take 8 mg by mouth once daily.      STIOLTO RESPIMAT 2.5-2.5 mcg/actuation Mist SMARTSI Puff(s) Via Inhaler Daily      tocilizumab 162 mg/0.9 mL PnIj Inject as directed.   Indications: rheumatoid arthritis      trazodone (DESYREL) 150 MG tablet Take 150 mg by mouth every evening.      vit C,J-Ok-kimgh-lutein-zeaxan (PRESERVISION AREDS-2) 250-90-40-1 mg Cap Take 1 capsule by mouth twice daily.  Indications: vitamin supplement      vitamin E 400 UNIT capsule Take 1 capsule by mouth daily.  Indications: vitamin supplement      doxycycline (MONODOX) 100 MG capsule Take 100 mg by mouth 2 (two) times daily.      methocarbamoL (ROBAXIN) 500 MG Tab Take 1 tablet by mouth in the morning if needed.  Indications: muscle spasm      methocarbamoL (ROBAXIN) 750 MG Tab Take 750 mg by mouth nightly.      nitroGLYCERIN 0.4 MG/DOSE TL SPRY (NITROLINGUAL) 400 mcg/spray spray Use as directed as needed.  Indications: angina/acute chest pain      tocilizumab (ACTEMRA IV)        No current facility-administered medications for this visit.     Review of patient's allergies  "indicates:   Allergen Reactions    Pregabalin      Other reaction(s): Depression    Flomax [tamsulosin]      Made extremity weak    Roflumilast     Sulfa (sulfonamide antibiotics)     Sulfa dyne     Sulfur      Old type not new types of sulfur    Latex Itching    Tegretol [carbamazepine] Itching and Rash       Review of Systems   Cardiovascular:  Positive for leg swelling.        Majority of edema controlled with compression wrap right leg open toed compression hose left lower leg   Endocrine:        >10 yr DM2   Musculoskeletal:  Positive for gait problem.        Walker, limited mobility    All other systems reviewed and are negative.      Objective:      Vitals:    11/08/23 1008   BP: 109/61   Pulse: 78   Resp: 16   Weight: 103.2 kg (227 lb 8.2 oz)   Height: 5' 10" (1.778 m)     Physical Exam  Vitals and nursing note reviewed.   Constitutional:       General: He is not in acute distress.     Appearance: Normal appearance.   Cardiovascular:      Pulses:           Dorsalis pedis pulses are 1+ on the right side and 1+ on the left side.        Posterior tibial pulses are 1+ on the right side and 1+ on the left side.   Pulmonary:      Effort: Pulmonary effort is normal.   Musculoskeletal:      Right lower leg: Edema present.      Left lower leg: Edema present.      Right foot: Decreased range of motion (Arthritic and degenerative changes bilateral feet limited range of motion 1st MPJ and midfoot bilateral). Bunion present.      Left foot: Decreased range of motion.   Feet:      Right foot:      Skin integrity: No erythema.      Toenail Condition: Right toenails are abnormally thick and ingrown.      Left foot:      Skin integrity: No erythema (Pain, no infection due to ingrown nail lateral left hallux).      Toenail Condition: Left toenails are abnormally thick and ingrown.   Skin:     Capillary Refill: Capillary refill takes 2 to 3 seconds.      Comments: Venous insufficiency right greater than left lower extremity.  " Compression wrap clean dry and left intact right lower leg   Neurological:      Mental Status: He is alert.   Psychiatric:         Behavior: Behavior normal.         Thought Content: Thought content normal.                           Assessment:       1. Ingrown nail of great toe of left foot    2. Type 2 diabetes, controlled, with neuropathy    3. Ingrown nail of great toe of right foot    4. Venous insufficiency of right lower extremity    5. Onychomycosis of toenail            Plan:         Reviewed ingrown nails, patient is at risk for recurrence due to thick fungal nails, limited mobility and inability to reach, treat himself.  Reviewed signs of infection to monitor for potential complications regarding these areas  Ingrown nail removed/debrided both borders bilateral hallux.  No bleeding or drainage present.  Advised patient dry skin along the nail folds contribute, thickened fungal nail contributes, if someone can help apply Vicks vapor rub just a few times a week may help hydrate these areas better and avoid pressure he is feeling due to ingrown nail  Few areas with dry plaques were smooth today with no skin breaks, no areas of erythema  Bandage right lower leg clean dry and was left intact although would recommend compression of the entire lower leg as this bandage is just around the wound above the ankle  Reviewed potential complications with lower leg wound right  Patient does understand issues with chronic swelling of the right leg, he states this occurred after a surgery  He continues to wear open toed compression hose on the left lower leg  Reviewed diabetes, well-controlled  Reviewed need for daily foot checks and instructed patient to contact the office with any area of redness or swelling which has not improved within 3 days.  Patient was in understanding and agreement with treatment plan.  I counseled the patient on their conditions, implications and medical management.  Instructed patient to  contact the office with any changes, questions, concerns, worsening of symptoms.   Total face to face time 30 minutes, exam, assessment, treatment, discussion, additional time for review of chart prior to and following appointment and visit documentation, consultation and coordination of care.    Follow up 3 months for annual diabetic foot exam.    This note was created using [employer] voice recognition software that occasionally misinterpreted phrases or words.

## 2024-02-21 ENCOUNTER — OFFICE VISIT (OUTPATIENT)
Dept: PODIATRY | Facility: CLINIC | Age: 84
End: 2024-02-21
Payer: MEDICARE

## 2024-02-21 VITALS
HEART RATE: 58 BPM | RESPIRATION RATE: 16 BRPM | WEIGHT: 213 LBS | HEIGHT: 70 IN | BODY MASS INDEX: 30.49 KG/M2 | SYSTOLIC BLOOD PRESSURE: 125 MMHG | DIASTOLIC BLOOD PRESSURE: 59 MMHG

## 2024-02-21 DIAGNOSIS — L60.0 INGROWN NAIL OF GREAT TOE OF RIGHT FOOT: ICD-10-CM

## 2024-02-21 DIAGNOSIS — L60.0 INGROWN NAIL OF GREAT TOE OF LEFT FOOT: ICD-10-CM

## 2024-02-21 DIAGNOSIS — E11.40 TYPE 2 DIABETES, CONTROLLED, WITH NEUROPATHY: ICD-10-CM

## 2024-02-21 DIAGNOSIS — R60.0 EDEMA OF BOTH LOWER LEGS: ICD-10-CM

## 2024-02-21 DIAGNOSIS — I87.2 VENOUS INSUFFICIENCY OF RIGHT LOWER EXTREMITY: ICD-10-CM

## 2024-02-21 DIAGNOSIS — E11.9 COMPREHENSIVE DIABETIC FOOT EXAMINATION, TYPE 2 DM, ENCOUNTER FOR: Primary | ICD-10-CM

## 2024-02-21 DIAGNOSIS — Z74.09 LIMITED MOBILITY: ICD-10-CM

## 2024-02-21 PROCEDURE — 99214 OFFICE O/P EST MOD 30 MIN: CPT | Mod: S$PBB,,, | Performed by: PODIATRIST

## 2024-02-21 PROCEDURE — 99215 OFFICE O/P EST HI 40 MIN: CPT | Mod: PBBFAC,PN | Performed by: PODIATRIST

## 2024-02-21 PROCEDURE — 99999 PR PBB SHADOW E&M-EST. PATIENT-LVL V: CPT | Mod: PBBFAC,,, | Performed by: PODIATRIST

## 2024-02-21 RX ORDER — SENNOSIDES 25 MG/1
TABLET, FILM COATED ORAL
COMMUNITY
Start: 2024-02-05 | End: 2024-02-21 | Stop reason: SDUPTHER

## 2024-02-21 RX ORDER — GABAPENTIN 600 MG/1
600 TABLET ORAL
COMMUNITY
Start: 2024-02-05 | End: 2024-08-03

## 2024-02-21 RX ORDER — ALLOPURINOL 300 MG/1
TABLET ORAL
COMMUNITY
Start: 2024-02-07 | End: 2025-02-01

## 2024-02-21 NOTE — PROGRESS NOTES
Subjective:       Patient ID: Angel Duque Jr. is a 83 y.o. male.    Chief Complaint: Diabetic Foot Exam, Nail Problem, Ingrown Toenail, and Toe Pain  Patient presents for annual diabetic foot exam, follow up ingrown nails and venous insufficiency.    Patient relates home health is still coming for wound care, wound of the right lower leg twice weekly  Relates he finally feels it has started to heal, swelling has remained down in this leg enough for him to return to his open toed compression hose.  He removes compression hose left every night but he wears compression hose on the right 247.  It is remove to shower prior to home health coming in for wound care and then reapplied  He relates pain on sides of both big toes due to ingrown nails.  These areas bother him more than any other location on his foot or leg  Pain level 7/10  Reports diabetes has been doing very well with A1c 6.1.  Taking gabapentin for neuropathy pain       Of pain both big toes due to ingrown nails, denies redness, swelling or drainage.  Pain level 7/10  He is at a stasis ulcer due to venous insufficiency right lower leg most of this year.  Wound care applied 10 treatments of skin substitute, once a week times 10 weeks, started in August.  States it is improved but still a small opening present and some surrounding weeping, has a bandage on today with stockinette, clean dry.  Home health orders most likely through PCP as I have not received any recently  Patient relates diabetes has been doing very well with glucose 103 this morning  States he is doing better after recovering from open gallbladder surgery.  Has a wound with some drainage from this area as well which home health is treating also.      Past Medical History:   Diagnosis Date    AAA (abdominal aortic aneurysm)     Arthritis     Rheumatoid & Osteoarthritis    BPH (benign prostatic hyperplasia)     CAD (coronary artery disease)     Chronic bronchitis     COPD (chronic  obstructive pulmonary disease)     Depression     Diabetes mellitus, type 2     Diverticulosis     DM (diabetes mellitus)     Dyslipidemia     GERD (gastroesophageal reflux disease)     Gout     Hernia of unspecified site of abdominal cavity without mention of obstruction or gangrene     HTN (hypertension)     Hx of CABG     Hyperlipidemia     Hypothyroidism     Insomnia     Ischemic cardiomyopathy     Lower back pain     Lymphedema     Macular degeneration     MI (myocardial infarction)     HORTENSIA (obstructive sleep apnea)     Peripheral neuropathy     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     Tremor      Past Surgical History:   Procedure Laterality Date    ABDOMINAL AORTIC ANEURYSM REPAIR      ABDOMINAL HERNIA REPAIR      CARPAL TUNNEL RELEASE Right     CORONARY ARTERY BYPASS GRAFT      hemrhoidectomy      HERNIA REPAIR      NASAL SEPTUM SURGERY      TONSILLECTOMY      TOTAL KNEE ARTHROPLASTY      TOTAL KNEE ARTHROPLASTY Bilateral      Family History   Problem Relation Age of Onset    Esophageal cancer Paternal Uncle     Cancer Paternal Uncle 69        esophageal    Esophageal cancer Paternal Grandfather     Cancer Paternal Grandfather     Colon cancer Neg Hx      Social History     Socioeconomic History    Marital status:    Tobacco Use    Smoking status: Former     Current packs/day: 0.00     Types: Cigarettes     Quit date: 1997     Years since quittin.1    Smokeless tobacco: Never   Substance and Sexual Activity    Alcohol use: No    Drug use: No       Current Outpatient Medications   Medication Sig Dispense Refill    albuterol-ipratropium (DUO-NEB) 2.5 mg-0.5 mg/3 mL nebulizer solution Inhale by mouth if needed as directed.  Indications: COPD      allopurinoL (ZYLOPRIM) 300 MG tablet = 1 tab, Oral, Daily, # 90 tab, 3 Refill(s), Maintenance, Pharmacy: [facility], 178, cm, 24 14:21:00 CST, Height/Length Measured, 98, kg, 24 14:21:00 CST, Weight Dosing       amLODIPine (NORVASC) 5 MG tablet       ascorbic acid, vitamin C, (VITAMIN C) 1000 MG tablet Take 1 tablet by mouth daily.  Indications: vitamin supplement      atorvastatin (LIPITOR) 80 MG tablet Take 80 mg by mouth every evening.       azelastine (ASTELIN) 137 mcg (0.1 %) nasal spray 2 sprays.      butalbital-acetaminophen-caffeine -40 mg (FIORICET, ESGIC) -40 mg per tablet   2 tab, Oral, q6h, PRN for headache, # 120 tab, 0 Refill(s), Pharmacy: [id number], 175, cm, 08/15/22 13:26:00 CDT, Height/Length Measured, 112, kg, 08/05/22 11:07:00 CDT, Weight Dosing      calcium carb-mag oxide-vit D3 400-167-133 mg-mg-unit Tab Take 1 tablet by mouth daily or as directed.  Indications: supplement      carbidopa-levodopa  mg (SINEMET)  mg per tablet   90 tab, 0 Refill(s)      cholecalciferol, vitamin D3, (VITAMIN D3) 50 mcg (2,000 unit) Cap capsule Take 1 capsule by mouth daily.  Indications: vitamin supplement      DICLOFENAC SODIUM (VOLTAREN TOP) Apply 1 % topically as needed (for pain in knees).      ENTRESTO  mg per tablet Take 1 tablet by mouth 2 (two) times daily.      finasteride (PROSCAR) 5 mg tablet Take 5 mg by mouth once daily.      fluticasone propionate (FLONASE) 50 mcg/actuation nasal spray by Each Nostril route.      furosemide (LASIX) 80 MG tablet Take 1 tablet by mouth only if needed as directed.  Indications: edema      gabapentin (NEURONTIN) 600 MG tablet 600 mg.      glimepiride (AMARYL) 2 MG tablet Take 2 mg by mouth once daily.      HYDROcodone-acetaminophen (NORCO) 7.5-325 mg per tablet Take 1 tablet by mouth 3 (three) times daily as needed.      ipratropium (ATROVENT) 0.02 % nebulizer solution Take 500 mcg by nebulization 4 (four) times daily. Rescue      ipratropium (ATROVENT) 21 mcg (0.03 %) nasal spray SMARTSIG:Both Nares      levothyroxine (SYNTHROID) 88 MCG tablet Take 88 mcg by mouth before breakfast.       LIDOcaine 5 % Crea   1 cleve, Topical, TID, # 45 gm, 5  Refill(s), Pharmacy: [facility], Diabetic neuropathy, 178, cm, 23 13:02:00 CDT, Height/Length Measured, 113, kg, 23 12:14:00 CDT, Weight Dosing      metFORMIN (GLUCOPHAGE) 850 MG tablet   = 1 tab, Oral, BID, *ZYDUS*., # 180 tab, 3 Refill(s), Maintenance, Pharmacy: [facility], 179, cm, 21 12:19:00 CDT, Height/Length Measured, 117.8, kg, 21 12:19:00 CDT, Weight Dosing      methocarbamoL (ROBAXIN) 750 MG Tab Take 750 mg by mouth nightly.      metoprolol succinate (TOPROL-XL) 25 MG 24 hr tablet Take 25 mg by mouth once daily.      multivit-min-FA-lycopen-lutein (CENTRUM SILVER) 0.4 mg-300 mcg- 250 mcg Tab Take 1 tablet by mouth daily.  Indications: vitamin supplement      omeprazole (PRILOSEC) 40 MG capsule Take 40 mg by mouth 2 (two) times daily.      OPW GABAPENTIN 5% LIDOCAINE HCL 5% TOPICAL CREAM 50G   1 cleve, Topical, TID, # 45 gm, 5 Refill(s), Pharmacy: [id number], Diabetic neuropathy, 175, cm, 23 13:05:00 CST, Height/Length Measured, 112.8, kg, 23 13:34:00 CST, Weight Dosing      potassium chloride (K-TAB) 20 mEq Take 1 tablet by mouth only if needed as directed, with Furosemide.  Indications: potassium levels      ranibizumab (LUCENTIS) 0.3 mg/0.05 mL Soln Inject as directed.  Indications: macular degeneration/eye condition      ranolazine (RANEXA) 1,000 mg Tb12 Take 1,000 mg by mouth 2 (two) times daily.      sertraline (ZOLOFT) 100 MG tablet   = 1 tab, Oral, Daily, # 90 tab, 3 Refill(s), Maintenance, Pharmacy: [facility], 179, cm, 21 14:52:00 CDT, Height/Length Measured, 117.02, kg, 21 14:52:00 CDT, Weight Dosing      silodosin (RAPAFLO) 8 mg Cap capsule Take 8 mg by mouth once daily.      STIOLTO RESPIMAT 2.5-2.5 mcg/actuation Mist SMARTSI Puff(s) Via Inhaler Daily      trazodone (DESYREL) 150 MG tablet Take 150 mg by mouth every evening.      vit C,R-Tc-jzese-lutein-zeaxan (PRESERVISION  "AREDS-2) 250-90-40-1 mg Cap Take 1 capsule by mouth twice daily.  Indications: vitamin supplement      vitamin E 400 UNIT capsule Take 1 capsule by mouth daily.  Indications: vitamin supplement      allopurinol (ZYLOPRIM) 300 MG tablet Take 300 mg by mouth once daily.       gabapentin (NEURONTIN) 600 MG tablet 600 mg.      LIDOcaine 5 % Crea 1 cleve, Topical, TID, # 45 gm, 5 Refill(s), Pharmacy: [facility], Diabetic neuropathy, 178, cm, 02/05/24 12:58:00 CST, Height/Length Measured, 99.1, kg, 01/22/24 13:53:00 CST, Weight Dosing      methocarbamoL (ROBAXIN) 500 MG Tab Take 1 tablet by mouth in the morning if needed.  Indications: muscle spasm      nitroGLYCERIN 0.4 MG/DOSE TL SPRY (NITROLINGUAL) 400 mcg/spray spray Use as directed as needed.  Indications: angina/acute chest pain      tocilizumab (ACTEMRA IV)       tocilizumab 162 mg/0.9 mL PnIj Inject as directed.   Indications: rheumatoid arthritis       No current facility-administered medications for this visit.     Review of patient's allergies indicates:   Allergen Reactions    Pregabalin      Other reaction(s): Depression    Flomax [tamsulosin]      Made extremity weak    Roflumilast     Sulfa (sulfonamide antibiotics)     Sulfa dyne     Sulfur      Old type not new types of sulfur    Latex Itching    Tegretol [carbamazepine] Itching and Rash       Review of Systems   Cardiovascular:  Positive for leg swelling.        Edema much improved with open toed compression hose b/l   Endocrine:        >10 yr DM2   Musculoskeletal:  Positive for gait problem.        Walker, limited mobility    All other systems reviewed and are negative.      Objective:      Vitals:    02/21/24 0919   BP: (!) 125/59   Pulse: (!) 58   Resp: 16   Weight: 96.6 kg (213 lb)   Height: 5' 10" (1.778 m)     Physical Exam  Vitals and nursing note reviewed.   Constitutional:       General: He is not in acute distress.     Appearance: Normal appearance.   Cardiovascular:      Pulses:       "     Dorsalis pedis pulses are 1+ on the right side and 1+ on the left side.        Posterior tibial pulses are 1+ on the right side and 1+ on the left side.   Pulmonary:      Effort: Pulmonary effort is normal.   Musculoskeletal:         General: No tenderness.      Right foot: Decreased range of motion (Arthritic and degenerative changes bilateral feet limited range of motion 1st MPJ and midfoot bilateral). Bunion present.      Left foot: Decreased range of motion.   Feet:      Right foot:      Protective Sensation: 8 sites tested.  3 sites sensed.      Skin integrity: Dry skin (Mild dry skin heels) present. No erythema.      Toenail Condition: Right toenails are ingrown.      Left foot:      Protective Sensation: 8 sites tested.  3 sites sensed.      Skin integrity: Dry skin present. No erythema (Pain medial and lateral hallux, mild ingrown nails without infection).      Toenail Condition: Left toenails are ingrown.   Skin:     Capillary Refill: Capillary refill takes 2 to 3 seconds.      Comments: Venous insufficiency right greater than left lower extremity.  Compression wrap clean dry and left intact right lower leg   Neurological:      Mental Status: He is alert.      Sensory: Sensory deficit present.      Comments: Did not detect monofilament testing all areas forefoot and midfoot bilateral, intact where foot, no pain/paresthesias   Psychiatric:         Behavior: Behavior normal.         Thought Content: Thought content normal.                             Assessment:       1. Comprehensive diabetic foot examination, type 2 DM, encounter for    2. Type 2 diabetes, controlled, with neuropathy    3. Venous insufficiency of right lower extremity    4. Edema of both lower legs    5. Ingrown nail of great toe of right foot    6. Ingrown nail of great toe of left foot    7. Limited mobility              Plan:           Comprehensive diabetic pedal exam performed  Reviewed lack of sensation front of the feet, not only  toes and forefoot but into the arch  Advised patient he is at high risk for complications regarding lack of feeling in his feet  We discussed neuropathy at length, both due to the lack of feeling, but can cause increased pain in areas of pressure.  Advised patient it is very common for people with neuropathy to have a lot of discomfort around the corners of the nail even if they are not fully ingrown  Explained his nails do curl into the skin, they are mildly ingrown, should be treated on a regular basis to prevent complications  Reviewed A1c, currently well-controlled  Reviewed benefit of controled glucose/diabetes regarding potential foot problems complications  Reviewed diabetic education  Reviewed ingrown nails, signs of infection to monitor for potential complications regarding these areas  Ingrown nail removed/debrided both borders bilateral hallux.  No bleeding or drainage present.  When able have caregiver apply Vicks vapor rub few times   Reviewed care and maintenance of skin  Advised patient to continue to heal the wound on the right leg, prevent complications, he must control swelling indefinitely  Even when the wound is closed and dry on the right leg I would recommend he continue compression hose 24/7 and remove only for shower to have someone reapply shortly after  Explained even once the wound is healed he will be at high risk for recurrence on the right leg and we reviewed venous insufficiency    High risk for complications also due to lack of sensation in feet  Reviewed wide appropriate shoes, light weight, even indoors to protect feet and for stability  Reviewed need for daily foot checks and instructed patient to contact the office with any area of redness or swelling which has not improved in a few days.  Patient was in understanding and agreement with treatment plan.  I counseled the patient on their conditions, implications and medical management.  Instructed patient to contact the office with  any changes, questions, concerns, worsening of symptoms.   Total face to face time 30 minutes, exam, assessment, treatment, discussion, additional time for review of chart prior to and following appointment and visit documentation, consultation and coordination of care.    Follow up p.r.n. 3 months      This note was created using M*HOLL[employer] voice recognition software that occasionally misinterpreted phrases or words.

## 2024-05-22 ENCOUNTER — OFFICE VISIT (OUTPATIENT)
Dept: PODIATRY | Facility: CLINIC | Age: 84
End: 2024-05-22
Payer: MEDICARE

## 2024-05-22 VITALS
HEART RATE: 51 BPM | SYSTOLIC BLOOD PRESSURE: 126 MMHG | DIASTOLIC BLOOD PRESSURE: 57 MMHG | BODY MASS INDEX: 30.78 KG/M2 | RESPIRATION RATE: 18 BRPM | WEIGHT: 215 LBS | HEIGHT: 70 IN

## 2024-05-22 DIAGNOSIS — E11.40 TYPE 2 DIABETES, CONTROLLED, WITH NEUROPATHY: ICD-10-CM

## 2024-05-22 DIAGNOSIS — I87.2 VENOUS INSUFFICIENCY OF RIGHT LOWER EXTREMITY: ICD-10-CM

## 2024-05-22 DIAGNOSIS — L60.0 INGROWN NAIL OF GREAT TOE OF LEFT FOOT: ICD-10-CM

## 2024-05-22 DIAGNOSIS — R60.0 PEDAL EDEMA: Primary | ICD-10-CM

## 2024-05-22 PROCEDURE — 99213 OFFICE O/P EST LOW 20 MIN: CPT | Mod: S$PBB,,, | Performed by: PODIATRIST

## 2024-05-22 PROCEDURE — 99215 OFFICE O/P EST HI 40 MIN: CPT | Mod: PBBFAC | Performed by: PODIATRIST

## 2024-05-22 PROCEDURE — 99999 PR PBB SHADOW E&M-EST. PATIENT-LVL V: CPT | Mod: PBBFAC,,, | Performed by: PODIATRIST

## 2024-05-22 RX ORDER — SILVER SULFADIAZINE 10 G/1000G
CREAM TOPICAL
COMMUNITY
Start: 2024-03-15

## 2024-05-22 RX ORDER — POTASSIUM CHLORIDE 20 MEQ/1
20 TABLET, EXTENDED RELEASE ORAL
COMMUNITY
Start: 2024-04-03

## 2024-05-22 RX ORDER — MINOCYCLINE HYDROCHLORIDE 100 MG/1
100 CAPSULE ORAL 2 TIMES DAILY
COMMUNITY
Start: 2024-03-28

## 2024-05-22 RX ORDER — GENTAMICIN SULFATE 1 MG/G
OINTMENT TOPICAL
COMMUNITY
Start: 2024-03-15

## 2024-05-22 RX ORDER — PREDNISONE 5 MG/1
TABLET ORAL
COMMUNITY
Start: 2024-03-20

## 2024-05-22 NOTE — PROGRESS NOTES
Subjective:       Patient ID: Angel Duque Jr. is a 83 y.o. male.    Chief Complaint: Follow-up, Ingrown Toenail, and Diabetes Mellitus  Patient presents for follow-up due to diabetes, venous insufficiency right greater than left, ingrown nail left hallux.  Patient reports ingrown nail much improved as long as he continues to apply Vicks vapor rub.  Reports ongoing wound care for venous ulcer anterior right lower leg, had multiple  applications to shoe substitution which failed.  Patient relates it is still open and weeping.  Home health comes in 2 to 3 times a week.  He has however been able to continue with compression hose, majority of the swelling controlled in the right lower leg  Reports diabetes continues to do well with A1c 6.1      Past Medical History:   Diagnosis Date    AAA (abdominal aortic aneurysm)     Arthritis     Rheumatoid & Osteoarthritis    BPH (benign prostatic hyperplasia)     CAD (coronary artery disease)     Chronic bronchitis     COPD (chronic obstructive pulmonary disease)     Depression     Diabetes mellitus, type 2     Diverticulosis     DM (diabetes mellitus)     Dyslipidemia     GERD (gastroesophageal reflux disease)     Gout     Hernia of unspecified site of abdominal cavity without mention of obstruction or gangrene     HTN (hypertension)     Hx of CABG     Hyperlipidemia     Hypothyroidism     Insomnia     Ischemic cardiomyopathy     Lower back pain     Lymphedema     Macular degeneration     MI (myocardial infarction)     HORTENSIA (obstructive sleep apnea)     Peripheral neuropathy     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     Tremor      Past Surgical History:   Procedure Laterality Date    ABDOMINAL AORTIC ANEURYSM REPAIR      ABDOMINAL HERNIA REPAIR      CARPAL TUNNEL RELEASE Right     CORONARY ARTERY BYPASS GRAFT      hemrhoidectomy      HERNIA REPAIR      NASAL SEPTUM SURGERY      TONSILLECTOMY      TOTAL KNEE ARTHROPLASTY      TOTAL KNEE ARTHROPLASTY Bilateral       Family History   Problem Relation Name Age of Onset    Esophageal cancer Paternal Uncle      Cancer Paternal Uncle  69        esophageal    Esophageal cancer Paternal Grandfather      Cancer Paternal Grandfather      Colon cancer Neg Hx       Social History     Socioeconomic History    Marital status:    Tobacco Use    Smoking status: Former     Current packs/day: 0.00     Types: Cigarettes     Quit date: 1997     Years since quittin.3    Smokeless tobacco: Never   Substance and Sexual Activity    Alcohol use: No    Drug use: No       Current Outpatient Medications   Medication Sig Dispense Refill    albuterol-ipratropium (DUO-NEB) 2.5 mg-0.5 mg/3 mL nebulizer solution Inhale by mouth if needed as directed.  Indications: COPD      allopurinoL (ZYLOPRIM) 300 MG tablet = 1 tab, Oral, Daily, # 90 tab, 3 Refill(s), Maintenance, Pharmacy: [facility], 178, cm, 24 14:21:00 CST, Height/Length Measured, 98, kg, 24 14:21:00 CST, Weight Dosing      amLODIPine (NORVASC) 5 MG tablet       ascorbic acid, vitamin C, (VITAMIN C) 1000 MG tablet Take 1 tablet by mouth daily.  Indications: vitamin supplement      atorvastatin (LIPITOR) 80 MG tablet Take 80 mg by mouth every evening.       azelastine (ASTELIN) 137 mcg (0.1 %) nasal spray 2 sprays.      butalbital-acetaminophen-caffeine -40 mg (FIORICET, ESGIC) -40 mg per tablet   2 tab, Oral, q6h, PRN for headache, # 120 tab, 0 Refill(s), Pharmacy: [id number], 175, cm, 08/15/22 13:26:00 CDT, Height/Length Measured, 112, kg, 22 11:07:00 CDT, Weight Dosing      calcium carb-mag oxide-vit D3 400-167-133 mg-mg-unit Tab Take 1 tablet by mouth daily or as directed.  Indications: supplement      carbidopa-levodopa  mg (SINEMET)  mg per tablet   90 tab, 0 Refill(s)      cholecalciferol, vitamin D3, (VITAMIN D3) 50 mcg (2,000 unit) Cap capsule Take 1 capsule by mouth daily.  Indications: vitamin supplement       DICLOFENAC SODIUM (VOLTAREN TOP) Apply 1 % topically as needed (for pain in knees).      ENTRESTO  mg per tablet Take 1 tablet by mouth 2 (two) times daily.      finasteride (PROSCAR) 5 mg tablet Take 5 mg by mouth once daily.      fluticasone propionate (FLONASE) 50 mcg/actuation nasal spray by Each Nostril route.      furosemide (LASIX) 80 MG tablet Take 1 tablet by mouth only if needed as directed.  Indications: edema      gabapentin (NEURONTIN) 600 MG tablet 600 mg.      gentamicin (GARAMYCIN) 0.1 % ointment Apply topically.      glimepiride (AMARYL) 2 MG tablet Take 2 mg by mouth once daily.      HYDROcodone-acetaminophen (NORCO) 7.5-325 mg per tablet Take 1 tablet by mouth 3 (three) times daily as needed.      ipratropium (ATROVENT) 0.02 % nebulizer solution Take 500 mcg by nebulization 4 (four) times daily. Rescue      ipratropium (ATROVENT) 21 mcg (0.03 %) nasal spray SMARTSIG:Both Nares      levothyroxine (SYNTHROID) 88 MCG tablet Take 88 mcg by mouth before breakfast.       LIDOcaine 5 % Crea   1 cleve, Topical, TID, # 45 gm, 5 Refill(s), [facility] Di[facility], 178, cm, 08/01/23 13:02:00 CDT, Height/Length Measured, 113, kg, 07/31/23 12:14:00 CDT, Weight Dosing      metFORMIN (GLUCOPHAGE) 850 MG tablet   = 1 tab, Oral, BID, *ZYDUS*., # 180 tab, 3 Refill(s), Maintenance, Pharmacy: [facility], 179, cm, 05/03/21 12:19:00 CDT, Height/Length Measured, 117.8, kg, 05/03/21 12:19:00 CDT, Weight Dosing      methocarbamoL (ROBAXIN) 500 MG Tab Take 1 tablet by mouth in the morning if needed.  Indications: muscle spasm      metoprolol succinate (TOPROL-XL) 25 MG 24 hr tablet Take 25 mg by mouth once daily.      minocycline (MINOCIN,DYNACIN) 100 MG capsule Take 100 mg by mouth 2 (two) times daily.      multivit-min-FA-lycopen-lutein (CENTRUM SILVER) 0.4 mg-300 mcg- 250 mcg Tab Take 1 tablet by mouth daily.  Indications: vitamin supplement      omeprazole  (PRILOSEC) 40 MG capsule Take 40 mg by mouth 2 (two) times daily.      OPW GABAPENTIN 5% LIDOCAINE HCL 5% TOPICAL CREAM 50G   1 cleve, Topical, TID, # 45 gm, 5 Refill(s), Pharmacy: [id number], Diabetic neuropathy, 175, cm, 23 13:05:00 CST, Height/Length Measured, 112.8, kg, 23 13:34:00 CST, Weight Dosing      potassium chloride SA (K-DUR,KLOR-CON) 20 MEQ tablet Take 20 mEq by mouth.      predniSONE (DELTASONE) 5 MG tablet Take by mouth.      ranibizumab (LUCENTIS) 0.3 mg/0.05 mL Soln Inject as directed.  Indications: macular degeneration/eye condition      ranolazine (RANEXA) 1,000 mg Tb12 Take 1,000 mg by mouth 2 (two) times daily.      sertraline (ZOLOFT) 100 MG tablet   = 1 tab, Oral, Daily, # 90 tab, 3 Refill(s), Maintenance, Pharmacy: [facility], 179, cm, 21 14:52:00 CDT, Height/Length Measured, 117.02, kg, 21 14:52:00 CDT, Weight Dosing      silodosin (RAPAFLO) 8 mg Cap capsule Take 8 mg by mouth once daily.      silver sulfADIAZINE 1% (SILVADENE) 1 % cream Apply topically.      STIOLTO RESPIMAT 2.5-2.5 mcg/actuation Mist SMARTSI Puff(s) Via Inhaler Daily      trazodone (DESYREL) 150 MG tablet Take 150 mg by mouth every evening.      vit C,H-Sn-aevdu-lutein-zeaxan (PRESERVISION AREDS-2) 250-90-40-1 mg Cap Take 1 capsule by mouth twice daily.  Indications: vitamin supplement      vitamin E 400 UNIT capsule Take 1 capsule by mouth daily.  Indications: vitamin supplement      methocarbamoL (ROBAXIN) 750 MG Tab Take 750 mg by mouth nightly. (Patient not taking: Reported on 2024)      nitroGLYCERIN 0.4 MG/DOSE TL SPRY (NITROLINGUAL) 400 mcg/spray spray Use as directed as needed.  Indications: angina/acute chest pain (Patient not taking: Reported on 2024)      potassium chloride (K-TAB) 20 mEq Take 1 tablet by mouth only if needed as directed, with Furosemide.  Indications: potassium levels (Patient not taking: Reported on 2024)      tocilizumab  "(ACTEMRA IV)  (Patient not taking: Reported on 5/22/2024)       No current facility-administered medications for this visit.     Review of patient's allergies indicates:   Allergen Reactions    Pregabalin      Other reaction(s): Depression    Flomax [tamsulosin]      Made extremity weak    Roflumilast     Sulfa (sulfonamide antibiotics)     Sulfa dyne     Sulfur      Old type not new types of sulfur    Latex Itching    Tegretol [carbamazepine] Itching and Rash       Review of Systems   Cardiovascular:  Positive for leg swelling.        Edema much improved with open toed compression hose b/l   Endocrine:        >10 yr DM2   Musculoskeletal:  Positive for gait problem.        Walker, limited mobility    All other systems reviewed and are negative.      Objective:      Vitals:    05/22/24 1128   BP: (!) 126/57   Pulse: (!) 51   Resp: 18   Weight: 97.5 kg (215 lb)   Height: 5' 10" (1.778 m)     Physical Exam  Vitals and nursing note reviewed.   Constitutional:       General: He is not in acute distress.     Appearance: Normal appearance.   Cardiovascular:      Pulses:           Dorsalis pedis pulses are 1+ on the right side and 1+ on the left side.        Posterior tibial pulses are 1+ on the right side and 1+ on the left side.   Pulmonary:      Effort: Pulmonary effort is normal.   Musculoskeletal:      Right foot: Decreased range of motion (Arthritic and degenerative changes bilateral feet limited range of motion 1st MPJ and midfoot bilateral). Bunion present.      Left foot: Decreased range of motion.   Feet:      Right foot:      Skin integrity: No skin breakdown or erythema.      Toenail Condition: Right toenails are abnormally thick.      Left foot:      Skin integrity: No skin breakdown or erythema (chronic ingrown nail medial left hallux without pain).      Toenail Condition: Left toenails are abnormally thick and ingrown.   Skin:     Capillary Refill: Capillary refill takes 2 to 3 seconds.      Comments: Venous " insufficiency right greater than left lower extremity   Neurological:      Mental Status: He is alert.      Sensory: Sensory deficit present.   Psychiatric:         Behavior: Behavior normal.         Thought Content: Thought content normal.             Assessment:       1. Pedal edema    2. Venous insufficiency of right lower extremity    3. Ingrown nail of great toe of left foot    4. Type 2 diabetes, controlled, with neuropathy            Plan:         Reviewed control of foot swelling to avoid pressure  Right leg swelling is improved  Reviewed complications  Reviewed appropriate shoes, light weight, indoors as well to protect feet and for stability  Reviewed diabetic education  Reviewed neuropathy  Understands benefit of controled glucose/diabetes  Reviewed maintenance of skin and nails and potential complications  Ingrown nail removed/debrided medial left hallux,  instructed patient to continue Vicks vapor rub daily  Other nails debrided, advised patient it would be good to treat all of  nails with a very small application of Vicks vapor rub daily    Reviewed need for daily foot checks and instructed patient to contact the office with any area of redness or swelling which has not improved within 3 days.  Reviewed daily foot checks and instructed to contact the office with any area of  concern which has not improved in a few days.  Patient was in understanding and agreement with treatment plan.  I counseled the patient on their conditions, implications and medical management.  Instructed patient to contact the office with any changes, questions, concerns, worsening of symptoms.   Total face to face time 20 minutes, exam, assessment, treatment, discussion, additional time for review of chart prior to and following appointment and visit documentation, consultation and coordination of care.    Follow up 3 months      This note was created using [employer] voice recognition software that occasionally misinterpreted  phrases or words.

## 2024-08-21 ENCOUNTER — OFFICE VISIT (OUTPATIENT)
Dept: PODIATRY | Facility: CLINIC | Age: 84
End: 2024-08-21
Payer: MEDICARE

## 2024-08-21 VITALS
HEART RATE: 58 BPM | HEIGHT: 70 IN | WEIGHT: 221 LBS | SYSTOLIC BLOOD PRESSURE: 125 MMHG | DIASTOLIC BLOOD PRESSURE: 87 MMHG | RESPIRATION RATE: 18 BRPM | BODY MASS INDEX: 31.64 KG/M2

## 2024-08-21 DIAGNOSIS — R60.0 EDEMA OF BOTH LOWER LEGS: ICD-10-CM

## 2024-08-21 DIAGNOSIS — B35.1 ONYCHOMYCOSIS OF TOENAIL: ICD-10-CM

## 2024-08-21 DIAGNOSIS — I87.2 VENOUS INSUFFICIENCY OF BOTH LOWER EXTREMITIES: ICD-10-CM

## 2024-08-21 DIAGNOSIS — E11.40 TYPE 2 DIABETES, CONTROLLED, WITH NEUROPATHY: ICD-10-CM

## 2024-08-21 DIAGNOSIS — S90.821S BLISTER OF RIGHT FOOT, SEQUELA: Primary | ICD-10-CM

## 2024-08-21 PROCEDURE — 99213 OFFICE O/P EST LOW 20 MIN: CPT | Mod: S$PBB,,, | Performed by: PODIATRIST

## 2024-08-21 PROCEDURE — 99215 OFFICE O/P EST HI 40 MIN: CPT | Mod: PBBFAC | Performed by: PODIATRIST

## 2024-08-21 PROCEDURE — 99999 PR PBB SHADOW E&M-EST. PATIENT-LVL V: CPT | Mod: PBBFAC,,, | Performed by: PODIATRIST

## 2024-08-21 RX ORDER — METOPROLOL SUCCINATE 25 MG/1
TABLET, EXTENDED RELEASE ORAL
COMMUNITY
Start: 2024-05-02

## 2024-08-21 RX ORDER — BUTALBITAL, ACETAMINOPHEN AND CAFFEINE 50; 325; 40 MG/1; MG/1; MG/1
TABLET ORAL
COMMUNITY
Start: 2024-04-02

## 2024-08-21 RX ORDER — CARBIDOPA AND LEVODOPA 25; 100 MG/1; MG/1
TABLET ORAL
COMMUNITY
Start: 2024-05-01 | End: 2025-04-26

## 2024-08-21 RX ORDER — ONDANSETRON 4 MG/1
4 TABLET, ORALLY DISINTEGRATING ORAL EVERY 6 HOURS PRN
COMMUNITY
Start: 2024-06-26

## 2024-08-21 RX ORDER — FLUTICASONE PROPIONATE 50 MCG
2 SPRAY, SUSPENSION (ML) NASAL
COMMUNITY
Start: 2024-05-01

## 2024-08-21 RX ORDER — HYDROCODONE BITARTRATE AND ACETAMINOPHEN 10; 325 MG/1; MG/1
.5-1 TABLET ORAL EVERY 6 HOURS PRN
COMMUNITY
Start: 2024-06-26

## 2024-08-21 RX ORDER — METHOCARBAMOL 500 MG/1
1000 TABLET, FILM COATED ORAL
COMMUNITY
Start: 2024-06-14 | End: 2025-06-14

## 2024-08-21 RX ORDER — ATORVASTATIN CALCIUM 80 MG/1
TABLET, FILM COATED ORAL
COMMUNITY
Start: 2024-04-02 | End: 2025-03-28

## 2024-08-21 RX ORDER — FUROSEMIDE 80 MG/1
80 TABLET ORAL
COMMUNITY
Start: 2024-04-04 | End: 2024-10-01

## 2024-08-21 RX ORDER — SERTRALINE HYDROCHLORIDE 100 MG/1
TABLET, FILM COATED ORAL
COMMUNITY
Start: 2024-05-01 | End: 2025-04-26

## 2024-08-21 RX ORDER — LEVOFLOXACIN 500 MG/1
500 TABLET, FILM COATED ORAL
COMMUNITY
Start: 2024-06-27

## 2024-08-21 RX ORDER — METHOCARBAMOL 750 MG/1
750 TABLET, FILM COATED ORAL NIGHTLY PRN
COMMUNITY
Start: 2024-07-16

## 2024-08-21 RX ORDER — AMOXICILLIN AND CLAVULANATE POTASSIUM 875; 125 MG/1; MG/1
1 TABLET, FILM COATED ORAL EVERY 12 HOURS
COMMUNITY

## 2024-08-21 RX ORDER — TIOTROPIUM BROMIDE AND OLODATEROL 3.124; 2.736 UG/1; UG/1
SPRAY, METERED RESPIRATORY (INHALATION)
COMMUNITY
Start: 2024-06-19

## 2024-08-21 RX ORDER — GABAPENTIN 600 MG/1
600 TABLET ORAL
COMMUNITY
Start: 2024-08-05 | End: 2025-02-01

## 2024-08-22 NOTE — PROGRESS NOTES
Subjective:       Patient ID: Angel Duque Jr. is a 83 y.o. male.    Chief Complaint: Follow-up, Ingrown Toenail, Foot Swelling, and Diabetes Mellitus  Patient presents for follow-up due to diabetes, venous insufficiency.  Patient relates feet have been doing well, no problems with ingrown nail.  Believes he had a blister on the outside of the right foot and wound care applied a small amount of gentian violet.  Relates tender upon pressure, no drainage  Wound care is continuing to treat venous ulcer right lower leg weekly.  Swelling has remained improved over the last 3 months as patient has been able to get compression hose on the right leg  Had surgery by Dr. Bernabe for a wounds/abscess secondary to a mesh for abdominal surgery which was originally 9 months ago and states this is finally healed  Relates diabetes has been doing very well with A1c 6.1 range  No foot pain today       Past Medical History:   Diagnosis Date    AAA (abdominal aortic aneurysm)     Arthritis     Rheumatoid & Osteoarthritis    BPH (benign prostatic hyperplasia)     CAD (coronary artery disease)     Chronic bronchitis     COPD (chronic obstructive pulmonary disease)     Depression     Diabetes mellitus, type 2     Diverticulosis     DM (diabetes mellitus)     Dyslipidemia     GERD (gastroesophageal reflux disease)     Gout     Hernia of unspecified site of abdominal cavity without mention of obstruction or gangrene     HTN (hypertension)     Hx of CABG     Hyperlipidemia     Hypothyroidism     Insomnia     Ischemic cardiomyopathy     Lower back pain     Lymphedema     Macular degeneration     MI (myocardial infarction)     HORTENSIA (obstructive sleep apnea)     Peripheral neuropathy     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     Tremor      Past Surgical History:   Procedure Laterality Date    ABDOMINAL AORTIC ANEURYSM REPAIR      ABDOMINAL HERNIA REPAIR      CARPAL TUNNEL RELEASE Right     CORONARY ARTERY BYPASS GRAFT       hemrhoidectomy      HERNIA REPAIR      NASAL SEPTUM SURGERY      TONSILLECTOMY      TOTAL KNEE ARTHROPLASTY      TOTAL KNEE ARTHROPLASTY Bilateral      Family History   Problem Relation Name Age of Onset    Esophageal cancer Paternal Uncle      Cancer Paternal Uncle  69        esophageal    Esophageal cancer Paternal Grandfather      Cancer Paternal Grandfather      Colon cancer Neg Hx       Social History     Socioeconomic History    Marital status:    Tobacco Use    Smoking status: Former     Current packs/day: 0.00     Types: Cigarettes     Quit date: 1997     Years since quittin.6    Smokeless tobacco: Never   Substance and Sexual Activity    Alcohol use: No    Drug use: No       Current Outpatient Medications   Medication Sig Dispense Refill    albuterol-ipratropium (DUO-NEB) 2.5 mg-0.5 mg/3 mL nebulizer solution Inhale by mouth if needed as directed.  Indications: COPD      allopurinoL (ZYLOPRIM) 300 MG tablet = 1 tab, Oral, Daily, # 90 tab, 3 Refill(s), Maintenance, Pharmacy: [facility], 178, cm, 24 14:21:00 CST, Height/Length Measured, 98, kg, 24 14:21:00 CST, Weight Dosing      amoxicillin-clavulanate 875-125mg (AUGMENTIN) 875-125 mg per tablet Take 1 tablet by mouth every 12 (twelve) hours.      ascorbic acid, vitamin C, (VITAMIN C) 1000 MG tablet Take 1 tablet by mouth daily.  Indications: vitamin supplement      atorvastatin (LIPITOR) 80 MG tablet = 1 tab, Oral, Daily, # 90 tab, 3 Refill(s), Maintenance, Pharmacy: [facility], 178, cm, 24 13:33:00 CDT, Height/Length Measured, 102, kg, 24 13:33:00 CDT, Weight Dosing      azelastine (ASTELIN) 137 mcg (0.1 %) nasal spray 2 sprays.      butalbital-acetaminophen-caffeine -40 mg (ESGIC) -40 mg per tablet 2 tab, Oral, q6h, PRN for headache, # 120 tab, 0 Refill(s), Pharmacy: [facility], 178, cm, 24 12:48:00 CDT, Height/Length Measured, 105.8, kg, 24 12:48:00 CDT, Weight  Dosing      calcium carb-mag oxide-vit D3 400-167-133 mg-mg-unit Tab Take 1 tablet by mouth daily or as directed.  Indications: supplement      carbidopa-levodopa  mg (SINEMET)  mg per tablet 1 tab, Oral, HS, # 90 tab, 3 Refill(s), [facility], 178, cm, 05/01/24 13:35:00 CDT, Height/Length Measured, 102.1, kg, 05/01/24 13:35:00 CDT, Weight Dosing      cholecalciferol, vitamin D3, (VITAMIN D3) 50 mcg (2,000 unit) Cap capsule Take 1 capsule by mouth daily.  Indications: vitamin supplement      DICLOFENAC SODIUM (VOLTAREN TOP) Apply 1 % topically as needed (for pain in knees).      ENTRESTO  mg per tablet Take 1 tablet by mouth 2 (two) times daily.      finasteride (PROSCAR) 5 mg tablet Take 5 mg by mouth once daily.      fluticasone propionate (FLONASE) 50 mcg/actuation nasal spray 2 sprays.      furosemide (LASIX) 80 MG tablet Take 1 tablet by mouth only if needed as directed.  Indications: edema      gabapentin (NEURONTIN) 600 MG tablet 600 mg.      gentamicin (GARAMYCIN) 0.1 % ointment Apply topically.      glimepiride (AMARYL) 2 MG tablet Take 2 mg by mouth once daily.      HYDROcodone-acetaminophen (NORCO) 7.5-325 mg per tablet Take 1 tablet by mouth 3 (three) times daily as needed.      ipratropium (ATROVENT) 0.02 % nebulizer solution Take 500 mcg by nebulization 4 (four) times daily. Rescue      ipratropium (ATROVENT) 21 mcg (0.03 %) nasal spray SMARTSIG:Both Nares      levothyroxine (SYNTHROID) 88 MCG tablet Take 88 mcg by mouth before breakfast.       LIDOcaine 5 % Crea   1 cleve, Topical, TID, # 45 gm, 5 Refill(s), Pharmacy: [facility], Diabetic neuropathy, 178, cm, 08/01/23 13:02:00 CDT, Height/Length Measured, 113, kg, 07/31/23 12:14:00 CDT, Weight Dosing      metFORMIN (GLUCOPHAGE) 850 MG tablet   = 1 tab, Oral, BID, *ZYDUS*., # 180 tab, 3 Refill(s), Maintenance, Pharmacy: [facility], 179, cm, 05/03/21 12:19:00 CDT, Height/Length  Measured, 117.8, kg, 05/03/21 12:19:00 CDT, Weight Dosing      methocarbamoL (ROBAXIN) 500 MG Tab 1,000 mg.      methocarbamoL (ROBAXIN) 750 MG Tab Take 750 mg by mouth nightly as needed.      metoprolol succinate (TOPROL-XL) 25 MG 24 hr tablet = 1 tab, Oral, Daily, # 90 tab, 1 Refill(s), Maintenance, Pharmacy: [facility], 178, cm, 05/01/24 13:35:00 CDT, Height/Length Measured, 102.1, kg, 05/01/24 13:35:00 CDT, Weight Dosing      multivit-min-FA-lycopen-lutein (CENTRUM SILVER) 0.4 mg-300 mcg- 250 mcg Tab Take 1 tablet by mouth daily.  Indications: vitamin supplement      omeprazole (PRILOSEC) 40 MG capsule Take 40 mg by mouth 2 (two) times daily.      ondansetron (ZOFRAN-ODT) 4 MG TbDL Take 4 mg by mouth every 6 (six) hours as needed.      OPW GABAPENTIN 5% LIDOCAINE HCL 5% TOPICAL CREAM 50G   1 cleve, Topical, TID, # 45 gm, 5 Refill(s), Pharmacy: [id number], Diabetic neuropathy, 175, cm, 02/01/23 13:05:00 CST, Height/Length Measured, 112.8, kg, 01/24/23 13:34:00 CST, Weight Dosing      potassium chloride SA (K-DUR,KLOR-CON) 20 MEQ tablet Take 20 mEq by mouth.      predniSONE (DELTASONE) 5 MG tablet Take by mouth.      ranibizumab (LUCENTIS) 0.3 mg/0.05 mL Soln Inject as directed.  Indications: macular degeneration/eye condition      ranolazine (RANEXA) 1,000 mg Tb12 Take 1,000 mg by mouth 2 (two) times daily.      sertraline (ZOLOFT) 100 MG tablet = 1 tab, Oral, Daily, # 90 tab, 3 Refill(s), Maintenance, Pharmacy: [facility], 178, cm, 05/01/24 13:35:00 CDT, Height/Length Measured, 102.1, kg, 05/01/24 13:35:00 CDT, Weight Dosing      silodosin (RAPAFLO) 8 mg Cap capsule Take 8 mg by mouth once daily.      silver sulfADIAZINE 1% (SILVADENE) 1 % cream Apply topically.      tiotropium-olodateroL (STIOLTO RESPIMAT) 2.5-2.5 mcg/actuation Mist = 2 inh, Inhale, every 24 hours, # 3 EA, 3 Refill(s), Maintenance, Pharmacy: [facility], Moderate COPD (chronic obstructive pulmonary disease),  178, cm, 06/19/24 14:49:00 CDT, Height/Length Measured, 103.6, kg, 06/19/24 14:49:00 CDT, Weight...      trazodone (DESYREL) 150 MG tablet Take 150 mg by mouth every evening.      vit C,Q-Fs-ewjbp-lutein-zeaxan (PRESERVISION AREDS-2) 250-90-40-1 mg Cap Take 1 capsule by mouth twice daily.  Indications: vitamin supplement      vitamin E 400 UNIT capsule Take 1 capsule by mouth daily.  Indications: vitamin supplement      atorvastatin (LIPITOR) 80 MG tablet Take 80 mg by mouth every evening.  (Patient not taking: Reported on 8/21/2024)      furosemide (LASIX) 80 MG tablet 80 mg. (Patient not taking: Reported on 8/21/2024)      HYDROcodone-acetaminophen (NORCO)  mg per tablet Take 0.5-1 tablets by mouth every 6 (six) hours as needed. (Patient not taking: Reported on 8/21/2024)      levoFLOXacin (LEVAQUIN) 500 MG tablet Take 500 mg by mouth. (Patient not taking: Reported on 8/21/2024)      metoprolol succinate (TOPROL-XL) 25 MG 24 hr tablet Take 25 mg by mouth once daily. (Patient not taking: Reported on 8/21/2024)      minocycline (MINOCIN,DYNACIN) 100 MG capsule Take 100 mg by mouth 2 (two) times daily. (Patient not taking: Reported on 8/21/2024)      nitroGLYCERIN 0.4 MG/DOSE TL SPRY (NITROLINGUAL) 400 mcg/spray spray Use as directed as needed.  Indications: angina/acute chest pain (Patient not taking: Reported on 5/22/2024)       No current facility-administered medications for this visit.     Review of patient's allergies indicates:   Allergen Reactions    Pregabalin      Other reaction(s): Depression    Flomax [tamsulosin]      Made extremity weak    Roflumilast     Sulfa (sulfonamide antibiotics)     Sulfa dyne     Sulfur      Old type not new types of sulfur    Latex Itching    Tegretol [carbamazepine] Itching and Rash       Review of Systems   Cardiovascular:  Positive for leg swelling.        Edema much improved with open toed compression hose bandage under compression hose right   Endocrine:        >10  "yr DM2   Musculoskeletal:  Positive for gait problem.        Walker, limited mobility    All other systems reviewed and are negative.      Objective:      Vitals:    08/21/24 1118   BP: 125/87   Pulse: (!) 58   Resp: 18   Weight: 100.2 kg (221 lb)   Height: 5' 10" (1.778 m)     Physical Exam  Vitals and nursing note reviewed.   Constitutional:       General: He is not in acute distress.     Appearance: Normal appearance.   Cardiovascular:      Pulses:           Dorsalis pedis pulses are 1+ on the right side and 1+ on the left side.        Posterior tibial pulses are 1+ on the right side and 1+ on the left side.   Pulmonary:      Effort: Pulmonary effort is normal.   Musculoskeletal:      Right foot: Decreased range of motion (Arthritic and degenerative changes bilateral feet limited range of motion 1st MPJ and midfoot bilateral). Bunion present.      Left foot: Decreased range of motion.   Feet:      Right foot:      Skin integrity: No erythema (Remaining dry skin of small area previous blister lateral styloid process right, well heel).      Toenail Condition: Right toenails are abnormally thick.      Left foot:      Skin integrity: No erythema.      Toenail Condition: Left toenails are abnormally thick.   Skin:     Capillary Refill: Capillary refill takes 2 to 3 seconds.      Comments: Venous insufficiency right greater than left lower extremity   Neurological:      Sensory: Sensory deficit present.   Psychiatric:         Behavior: Behavior normal.         Thought Content: Thought content normal.                           Assessment:       1. Blister of right foot, sequela    2. Venous insufficiency of both lower extremities    3. Edema of both lower legs    4. Type 2 diabetes, controlled, with neuropathy    5. Onychomycosis of toenail            Plan:         Area of blistered lateral right foot was debrided and completely healed and dry.  It is  upon lateral pressure overlying the styloid process and " patient understands this is a slightly prominent bone in which he needs to make sure shoes do not cause any rubbing in this area.  Reassured patient areas completely healed and dry.  We did apply a small amount of gentian violet to the area and instructed patient to continue to have his home wound care check this area each time they come out  Patient understands it is extremely important to control swelling, this causes pressure and friction which puts him at risk for areas of complication  Reviewed elevation as much as possible at home and patient is very compliant with compression hose  Pointed out to patient how compression hose will push fluid to the front of the foot, needs to make sure his shoes are extra wide, soft material and preferably have 2 pair so he can alternate  Shoe should be worn indoors to protect feet and for stability  Reviewed diabetic education  Reviewed neuropathy  Reviewed maintenance of skin and nails and potential complications  Nails debrided, thickness reduced, no evidence of recurrence of ingrown nail, continue Vicks vapor rub once daily   Reviewed need for daily foot checks and instructed patient to contact the office with any area of concern which has not improved in a few days   Reviewed daily foot checks and instructed to contact the office with any area of  concern which has not improved in a few days.  Patient was in understanding and agreement with treatment plan.  I counseled the patient on their conditions, implications and medical management.  Instructed patient to contact the office with any changes, questions, concerns, worsening of symptoms.   Total face to face time 20 minutes, exam, assessment, treatment, discussion, additional time for review of chart prior to and following appointment and visit documentation, consultation and coordination of care.    Follow up 3 months      This note was created using M*Modal voice recognition software that occasionally misinterpreted  phrases or words.

## 2024-12-06 ENCOUNTER — OFFICE VISIT (OUTPATIENT)
Dept: PODIATRY | Facility: CLINIC | Age: 84
End: 2024-12-06
Payer: MEDICARE

## 2024-12-06 VITALS
SYSTOLIC BLOOD PRESSURE: 120 MMHG | HEIGHT: 70 IN | RESPIRATION RATE: 18 BRPM | WEIGHT: 222 LBS | BODY MASS INDEX: 31.78 KG/M2 | HEART RATE: 64 BPM | DIASTOLIC BLOOD PRESSURE: 67 MMHG

## 2024-12-06 DIAGNOSIS — E11.40 TYPE 2 DIABETES, CONTROLLED, WITH NEUROPATHY: ICD-10-CM

## 2024-12-06 DIAGNOSIS — S90.821S BLISTER OF RIGHT FOOT, SEQUELA: ICD-10-CM

## 2024-12-06 DIAGNOSIS — I87.2 VENOUS INSUFFICIENCY OF BOTH LOWER EXTREMITIES: ICD-10-CM

## 2024-12-06 DIAGNOSIS — L60.0 INGROWN NAIL OF GREAT TOE OF RIGHT FOOT: ICD-10-CM

## 2024-12-06 DIAGNOSIS — L60.0 INGROWN NAIL OF GREAT TOE OF LEFT FOOT: Primary | ICD-10-CM

## 2024-12-06 PROCEDURE — 99999 PR PBB SHADOW E&M-EST. PATIENT-LVL V: CPT | Mod: PBBFAC,,, | Performed by: PODIATRIST

## 2024-12-06 PROCEDURE — 99215 OFFICE O/P EST HI 40 MIN: CPT | Mod: PBBFAC | Performed by: PODIATRIST

## 2024-12-06 PROCEDURE — 99213 OFFICE O/P EST LOW 20 MIN: CPT | Mod: S$PBB,,, | Performed by: PODIATRIST

## 2024-12-06 RX ORDER — BUTALBITAL, ACETAMINOPHEN AND CAFFEINE 50; 325; 40 MG/1; MG/1; MG/1
TABLET ORAL
COMMUNITY
Start: 2024-11-12

## 2024-12-06 RX ORDER — LINEZOLID 600 MG/1
TABLET, FILM COATED ORAL
COMMUNITY
Start: 2024-11-13

## 2024-12-06 RX ORDER — FLUTICASONE PROPIONATE 50 MCG
2 SPRAY, SUSPENSION (ML) NASAL
COMMUNITY
Start: 2024-10-10

## 2024-12-06 RX ORDER — CEFTRIAXONE 2 G/1
2 INJECTION, POWDER, FOR SOLUTION INTRAMUSCULAR; INTRAVENOUS DAILY
COMMUNITY

## 2024-12-06 RX ORDER — FUROSEMIDE 80 MG/1
80 TABLET ORAL
COMMUNITY
Start: 2024-10-22 | End: 2025-10-17

## 2024-12-06 RX ORDER — OMEPRAZOLE 40 MG/1
CAPSULE, DELAYED RELEASE ORAL
COMMUNITY
Start: 2024-08-22

## 2024-12-06 RX ORDER — TIOTROPIUM BROMIDE AND OLODATEROL 3.124; 2.736 UG/1; UG/1
SPRAY, METERED RESPIRATORY (INHALATION)
COMMUNITY
Start: 2024-06-19

## 2024-12-06 RX ORDER — OMADACYCLINE 150 MG/1
TABLET, FILM COATED ORAL
COMMUNITY
Start: 2024-11-06

## 2024-12-08 NOTE — PROGRESS NOTES
Subjective:       Patient ID: Angel Duque Jr. is a 84 y.o. male.    Chief Complaint: Follow-up, Blister, Nail Problem, Foot Swelling, and Diabetes Mellitus  Patient presents for follow-up due to diabetes, venous insufficiency, complaint of very painful ingrown nails both big toes left greater than right  Denies redness swelling or drainage  Has some forefoot swelling due to compression hose but has controlled the swelling in his legs very well especially on the right which has been more chronic  Patient states he has a different wound care company coming in to care for the  Wound on the right lower legs, states it is almost healed.  Wears open toed compression hose every day  No change with diabetes, has been very well-controlled       Past Medical History:   Diagnosis Date    AAA (abdominal aortic aneurysm)     Arthritis     Rheumatoid & Osteoarthritis    BPH (benign prostatic hyperplasia)     CAD (coronary artery disease)     Chronic bronchitis     COPD (chronic obstructive pulmonary disease)     Depression     Diabetes mellitus, type 2     Diverticulosis     DM (diabetes mellitus)     Dyslipidemia     GERD (gastroesophageal reflux disease)     Gout     Hernia of unspecified site of abdominal cavity without mention of obstruction or gangrene     HTN (hypertension)     Hx of CABG     Hyperlipidemia     Hypothyroidism     Insomnia     Ischemic cardiomyopathy     Lower back pain     Lymphedema     Macular degeneration     MI (myocardial infarction)     HORTENSIA (obstructive sleep apnea)     Peripheral neuropathy     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     Tremor      Past Surgical History:   Procedure Laterality Date    ABDOMINAL AORTIC ANEURYSM REPAIR      ABDOMINAL HERNIA REPAIR      CARPAL TUNNEL RELEASE Right     CORONARY ARTERY BYPASS GRAFT      hemrhoidectomy      HERNIA REPAIR      NASAL SEPTUM SURGERY      TONSILLECTOMY      TOTAL KNEE ARTHROPLASTY      TOTAL KNEE ARTHROPLASTY Bilateral       Family History   Problem Relation Name Age of Onset    Esophageal cancer Paternal Uncle      Cancer Paternal Uncle  69        esophageal    Esophageal cancer Paternal Grandfather      Cancer Paternal Grandfather      Colon cancer Neg Hx       Social History     Socioeconomic History    Marital status:    Tobacco Use    Smoking status: Former     Current packs/day: 0.00     Types: Cigarettes     Quit date: 1997     Years since quittin.9    Smokeless tobacco: Never   Substance and Sexual Activity    Alcohol use: No    Drug use: No       Current Outpatient Medications   Medication Sig Dispense Refill    albuterol-ipratropium (DUO-NEB) 2.5 mg-0.5 mg/3 mL nebulizer solution Inhale by mouth if needed as directed.  Indications: COPD      allopurinoL (ZYLOPRIM) 300 MG tablet = 1 tab, Oral, Daily, # 90 tab, 3 Refill(s), Maintenance, Pharmacy: [facility], 178, cm, 24 14:21:00 CST, Height/Length Measured, 98, kg, 24 14:21:00 CST, Weight Dosing      amoxicillin-clavulanate 875-125mg (AUGMENTIN) 875-125 mg per tablet Take 1 tablet by mouth every 12 (twelve) hours.      ascorbic acid, vitamin C, (VITAMIN C) 1000 MG tablet Take 1 tablet by mouth daily.  Indications: vitamin supplement      atorvastatin (LIPITOR) 80 MG tablet = 1 tab, Oral, Daily, # 90 tab, 3 Refill(s), Maintenance, Pharmacy: [facility], 178, cm, 24 13:33:00 CDT, Height/Length Measured, 102, kg, 24 13:33:00 CDT, Weight Dosing      azelastine (ASTELIN) 137 mcg (0.1 %) nasal spray 2 sprays.      butalbital-acetaminophen-caffeine -40 mg (ESGIC) -40 mg per tablet 2 tab, Oral, q6h, PRN for headache, # 120 tab, 0 Refill(s), Pharmacy: [facility], 178, cm, 24 10:38:00 CST, Height/Length Measured, 103.78, kg, 24 10:38:00 CST, Weight Dosing      calcium carb-mag oxide-vit D3 400-167-133 mg-mg-unit Tab Take 1 tablet by mouth daily or as directed.  Indications: supplement       carbidopa-levodopa  mg (SINEMET)  mg per tablet 1 tab, Oral, HS, # 90 tab, 3 Refill(s), Pharmacy: [facility], 178, cm, 24 13:35:00 CDT, Height/Length Measured, 102.1, kg, 24 13:35:00 CDT, Weight Dosing      cefTRIAXone (ROCEPHIN) 2 gram injection 2 g Daily.      cholecalciferol, vitamin D3, (VITAMIN D3) 50 mcg (2,000 unit) Cap capsule Take 1 capsule by mouth daily.  Indications: vitamin supplement      DICLOFENAC SODIUM (VOLTAREN TOP) Apply 1 % topically as needed (for pain in knees).      ENTRESTO  mg per tablet Take 1 tablet by mouth 2 (two) times daily.      finasteride (PROSCAR) 5 mg tablet Take 5 mg by mouth once daily.      fluticasone propionate (FLONASE) 50 mcg/actuation nasal spray 2 sprays.      furosemide (LASIX) 80 MG tablet 80 mg.      gabapentin (NEURONTIN) 600 MG tablet 600 mg.      glimepiride (AMARYL) 2 MG tablet Take 2 mg by mouth once daily.      HYDROcodone-acetaminophen (NORCO) 7.5-325 mg per tablet Take 1 tablet by mouth 3 (three) times daily as needed.      ipratropium (ATROVENT) 0.02 % nebulizer solution Take 500 mcg by nebulization 4 (four) times daily. Rescue      ipratropium (ATROVENT) 21 mcg (0.03 %) nasal spray SMARTSIG:Both Nares      levothyroxine (SYNTHROID) 88 MCG tablet Take 88 mcg by mouth before breakfast.       LIDOcaine 5 % Crea   1 cleve, Topical, TID, # 45 gm, 5 Refill(s), Pharmacy: [facility], Diabetic neuropathy, 178, cm, 23 13:02:00 CDT, Height/Length Measured, 113, kg, 23 12:14:00 CDT, Weight Dosing      linezolid (ZYVOX) 600 mg Tab SMARTSI.0 Tablet(s) By Mouth Twice Daily      metFORMIN (GLUCOPHAGE) 850 MG tablet   = 1 tab, Oral, BID, *ZYDUS*., # 180 tab, 3 Refill(s), Maintenance, Pharmacy: [facility], 179, cm, 21 12:19:00 CDT, Height/Length Measured, 117.8, kg, 21 12:19:00 CDT, Weight Dosing      methocarbamoL (ROBAXIN) 500 MG Tab 1,000 mg.      methocarbamoL  (ROBAXIN) 750 MG Tab Take 750 mg by mouth nightly as needed.      metoprolol succinate (TOPROL-XL) 25 MG 24 hr tablet = 1 tab, Oral, Daily, # 90 tab, 1 Refill(s), Maintenance, Pharmacy: [facility], 178, cm, 05/01/24 13:35:00 CDT, Height/Length Measured, 102.1, kg, 05/01/24 13:35:00 CDT, Weight Dosing      multivit-min-FA-lycopen-lutein (CENTRUM SILVER) 0.4 mg-300 mcg- 250 mcg Tab Take 1 tablet by mouth daily.  Indications: vitamin supplement      nitroGLYCERIN 0.4 MG/DOSE TL SPRY (NITROLINGUAL) 400 mcg/spray spray       NUZYRA 150 mg Tab Take by mouth.      omeprazole (PRILOSEC) 40 MG capsule = 1 cap, Oral, BID, # 180 cap, 3 Refill(s), Maintenance, Pharmacy: [facility], 178, cm, 08/22/24 11:31:00 CDT, Height/Length Measured, 106.9, kg, 08/22/24 11:31:00 CDT, Weight Dosing      ondansetron (ZOFRAN-ODT) 4 MG TbDL Take 4 mg by mouth every 6 (six) hours as needed.      OPW GABAPENTIN 5% LIDOCAINE HCL 5% TOPICAL CREAM 50G   1 cleve, Topical, TID, # 45 gm, 5 Refill(s), Pharmacy: [id number], Diabetic neuropathy, 175, cm, 02/01/23 13:05:00 CST, Height/Length Measured, 112.8, kg, 01/24/23 13:34:00 CST, Weight Dosing      potassium chloride SA (K-DUR,KLOR-CON) 20 MEQ tablet Take 20 mEq by mouth.      predniSONE (DELTASONE) 5 MG tablet Take by mouth.      ranibizumab (LUCENTIS) 0.3 mg/0.05 mL Soln Inject as directed.  Indications: macular degeneration/eye condition      ranolazine (RANEXA) 1,000 mg Tb12 Take 1,000 mg by mouth 2 (two) times daily.      sertraline (ZOLOFT) 100 MG tablet = 1 tab, Oral, Daily, # 90 tab, 3 Refill(s), Maintenance, Pharmacy: [facility], 178, cm, 05/01/24 13:35:00 CDT, Height/Length Measured, 102.1, kg, 05/01/24 13:35:00 CDT, Weight Dosing      silodosin (RAPAFLO) 8 mg Cap capsule Take 8 mg by mouth once daily.      silver sulfADIAZINE 1% (SILVADENE) 1 % cream Apply topically.      tiotropium-olodateroL (STIOLTO RESPIMAT) 2.5-2.5 mcg/actuation Mist = 2 inh, Inhale,  "every 24 hours, # 3 EA, 3 Refill(s), Maintenance, Pharmacy: [facility], Moderate COPD (chronic obstructive pulmonary disease), 178, cm, 06/19/24 14:49:00 CDT, Height/Length Measured, 103.6, kg, 06/19/24 14:49:00 CDT, Weight Dosing      trazodone (DESYREL) 150 MG tablet Take 150 mg by mouth every evening.      vit C,O-An-zemwf-lutein-zeaxan (PRESERVISION AREDS-2) 250-90-40-1 mg Cap Take 1 capsule by mouth twice daily.  Indications: vitamin supplement      vitamin E 400 UNIT capsule Take 1 capsule by mouth daily.  Indications: vitamin supplement       No current facility-administered medications for this visit.     Review of patient's allergies indicates:   Allergen Reactions    Pregabalin      Other reaction(s): Depression    Flomax [tamsulosin]      Made extremity weak    Roflumilast     Sulfa (sulfonamide antibiotics)     Sulfa dyne     Sulfur      Old type not new types of sulfur    Latex Itching    Tegretol [carbamazepine] Itching and Rash       Review of Systems   Cardiovascular:  Positive for leg swelling.        Edema much improved, hold with open toed compression hose    Endocrine:        >10 yr DM2   Musculoskeletal:  Positive for gait problem.        Walker, limited mobility    All other systems reviewed and are negative.      Objective:      Vitals:    12/06/24 1112   BP: 120/67   Pulse: 64   Resp: 18   Weight: 100.7 kg (222 lb)   Height: 5' 10" (1.778 m)     Physical Exam  Vitals and nursing note reviewed.   Constitutional:       General: He is not in acute distress.     Appearance: Normal appearance.   Cardiovascular:      Pulses:           Dorsalis pedis pulses are 1+ on the right side and 1+ on the left side.        Posterior tibial pulses are 1+ on the right side and 1+ on the left side.   Musculoskeletal:      Right foot: Decreased range of motion (Arthritic and degenerative changes bilateral feet limited range of motion 1st MPJ and midfoot bilateral). Bunion present.      Left foot: " Decreased range of motion.   Feet:      Right foot:      Skin integrity: Ulcer (Preulcerative area lateral right foot common area of previous blister with callus tissue) and callus present. No erythema.      Toenail Condition: Right toenails are ingrown.      Left foot:      Skin integrity: No erythema (Painful ingrown nail medial left greater than right without infection).      Toenail Condition: Left toenails are ingrown.   Skin:     Capillary Refill: Capillary refill takes 2 to 3 seconds.      Comments: Pedal edema. Bandage for chronic venous insufficiency wound under right compression hose, clean, dry, left intact   Neurological:      Sensory: Sensory deficit present.   Psychiatric:         Thought Content: Thought content normal.                         Assessment:       1. Ingrown nail of great toe of left foot    2. Blister of right foot, sequela    3. Ingrown nail of great toe of right foot    4. Venous insufficiency of both lower extremities    5. Type 2 diabetes, controlled, with neuropathy          Plan:         Advised patient blister is healing well in the outside of the right foot, does take a while for these areas to heal due to diabetes and location/pressure in shoes  It is considered preulcerative callus and is at risk for complication  Area was debrided today, no skin opening redness or swelling at this time  Instructed patient to have home health check it every time they come out  Reviewed care and maintenance of skin  Advised patient there is a huge improvement in swelling, most notable decrease on the right lower leg which has always been worse severe   Patient understands potential complications due to conditions of his skin and chronic swelling  Patient understands he has to have compression hose on every day, due to venous insufficiency he knows if compression is not kept on the legs the swelling will return   Reviewed appropriate shoes to be worn indoors to protect feet and for  stability  Reviewed diabetic education  Reviewed maintenance of skin and nails and potential complications  Ingrown nails debrided, thickness reduced, no bleeding or drainage.  Due to curvature of the nail and previous ingrown nails he is at risk for recurrence  Encouraged patient to have home health nurse apply lotion or Vicks vapor rub to the corners of both big toenails when they come out, small amount make sure it is thoroughly absorbed into the skin and it can be very effective to lubricate and prevent recurrence  Monitor for signs of infection  Reviewed need for daily foot checks and instructed patient to contact the office with any area of concern which has not improved in a few days   Reviewed daily foot checks and instructed to contact the office with any area of  concern which has not improved in a few days.  Patient was in understanding and agreement with treatment plan.  I counseled the patient on their conditions, implications and medical management.  Instructed patient to contact the office with any changes, questions, concerns, worsening of symptoms.   Total face to face time 20 minutes, exam, assessment, treatment, discussion, additional time for review of chart prior to and following appointment and visit documentation, consultation and coordination of care.    Follow up 3 months      This note was created using M*Modal voice recognition software that occasionally misinterpreted phrases or words.

## 2025-03-07 ENCOUNTER — OFFICE VISIT (OUTPATIENT)
Dept: PODIATRY | Facility: CLINIC | Age: 85
End: 2025-03-07
Payer: MEDICARE

## 2025-03-07 VITALS — BODY MASS INDEX: 35.62 KG/M2 | HEIGHT: 70 IN | WEIGHT: 248.81 LBS

## 2025-03-07 DIAGNOSIS — R20.0 NUMBNESS OF FEET: ICD-10-CM

## 2025-03-07 DIAGNOSIS — E11.40 TYPE 2 DIABETES, CONTROLLED, WITH NEUROPATHY: ICD-10-CM

## 2025-03-07 DIAGNOSIS — I87.2 VENOUS INSUFFICIENCY OF BOTH LOWER EXTREMITIES: ICD-10-CM

## 2025-03-07 DIAGNOSIS — L84 PRE-ULCERATIVE CALLUSES: ICD-10-CM

## 2025-03-07 DIAGNOSIS — E11.9 COMPREHENSIVE DIABETIC FOOT EXAMINATION, TYPE 2 DM, ENCOUNTER FOR: Primary | ICD-10-CM

## 2025-03-07 PROCEDURE — 99215 OFFICE O/P EST HI 40 MIN: CPT | Mod: PBBFAC | Performed by: PODIATRIST

## 2025-03-07 PROCEDURE — 99214 OFFICE O/P EST MOD 30 MIN: CPT | Mod: S$PBB,,, | Performed by: PODIATRIST

## 2025-03-07 PROCEDURE — 99999 PR PBB SHADOW E&M-EST. PATIENT-LVL V: CPT | Mod: PBBFAC,,, | Performed by: PODIATRIST

## 2025-03-07 RX ORDER — AMOXICILLIN AND CLAVULANATE POTASSIUM 562.5; 437.5; 62.5 MG/1; MG/1; MG/1
2 TABLET, FILM COATED, EXTENDED RELEASE ORAL 2 TIMES DAILY
COMMUNITY

## 2025-03-09 NOTE — PROGRESS NOTES
Subjective:       Patient ID: Angel Duque Jr. is a 84 y.o. male.    Chief Complaint: Follow-up (3 month follow up ), Diabetic Foot Exam, Foot Swelling (Right foot ), Foot Problem (Left foot has a small sore near the heel), and Numbness (Both feet )  Patient presents for annual diabetic foot exam, follow-up due venous insufficiency, complaint of tender area on the outside of the right foot.  This is an area that has been recurring secondary to a blister quite a while ago, not sure if compression hose irritate it  He was not aware he had a scab on the outside of the left ankle, no discomfort in this area  Relates home health is still coming out but not as frequently.  Reports stasis ulcer on the front of the right lower legs as most completely controlled, he has been able to wear compression hose daily over the last 3 months in it has really controlled swelling  Denies burning or tingling in feet but states they feel numb all the time and he is very cautious when walking  Reports diabetes well-controlled A1c 6.112/24  He has remained on Augmentin for 5-1/2 months due to a abdominal wound/abscess and redo of hernia repair  Overall pain 5/10     Past Medical History:   Diagnosis Date    AAA (abdominal aortic aneurysm)     Arthritis     Rheumatoid & Osteoarthritis    BPH (benign prostatic hyperplasia)     CAD (coronary artery disease)     Chronic bronchitis     COPD (chronic obstructive pulmonary disease)     Depression     Diabetes mellitus, type 2     Diverticulosis     DM (diabetes mellitus)     Dyslipidemia     GERD (gastroesophageal reflux disease)     Gout     Hernia of unspecified site of abdominal cavity without mention of obstruction or gangrene     HTN (hypertension)     Hx of CABG     Hyperlipidemia     Hypothyroidism     Insomnia     Ischemic cardiomyopathy     Lower back pain     Lymphedema     Macular degeneration     MI (myocardial infarction)     HORTENSIA (obstructive sleep apnea)     Peripheral  neuropathy     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     Tremor      Past Surgical History:   Procedure Laterality Date    ABDOMINAL AORTIC ANEURYSM REPAIR      ABDOMINAL HERNIA REPAIR      CARPAL TUNNEL RELEASE Right     CORONARY ARTERY BYPASS GRAFT      hemrhoidectomy      HERNIA REPAIR      NASAL SEPTUM SURGERY      TONSILLECTOMY      TOTAL KNEE ARTHROPLASTY      TOTAL KNEE ARTHROPLASTY Bilateral      Family History   Problem Relation Name Age of Onset    Esophageal cancer Paternal Uncle      Cancer Paternal Uncle  69        esophageal    Esophageal cancer Paternal Grandfather      Cancer Paternal Grandfather      Colon cancer Neg Hx       Social History     Socioeconomic History    Marital status:    Tobacco Use    Smoking status: Former     Current packs/day: 0.00     Types: Cigarettes     Quit date: 1997     Years since quittin.1    Smokeless tobacco: Never   Substance and Sexual Activity    Alcohol use: No    Drug use: No       Current Outpatient Medications   Medication Sig Dispense Refill    albuterol-ipratropium (DUO-NEB) 2.5 mg-0.5 mg/3 mL nebulizer solution Inhale by mouth if needed as directed.  Indications: COPD      amoxicillin-clavulanate 1,000-62.5 mg (AUGMENTIN XR) 1,000-62.5 mg per tablet Take 2 tablets by mouth 2 (two) times daily.      ascorbic acid, vitamin C, (VITAMIN C) 1000 MG tablet Take 1 tablet by mouth daily.  Indications: vitamin supplement      atorvastatin (LIPITOR) 80 MG tablet = 1 tab, Oral, Daily, # 90 tab, 3 Refill(s), Maintenance, Pharmacy: [facility], 178, cm, 24 13:33:00 CDT, Height/Length Measured, 102, kg, 24 13:33:00 CDT, Weight Dosing      azelastine (ASTELIN) 137 mcg (0.1 %) nasal spray 2 sprays.      butalbital-acetaminophen-caffeine -40 mg (ESGIC) -40 mg per tablet 2 tab, Oral, q6h, PRN for headache, # 120 tab, 0 Refill(s), Pharmacy: [facility], 178, cm, 24 10:38:00 CST, Height/Length  Measured, 103.78, kg, 11/11/24 10:38:00 CST, Weight Dosing      calcium carb-mag oxide-vit D3 400-167-133 mg-mg-unit Tab Take 1 tablet by mouth daily or as directed.  Indications: supplement      cefTRIAXone (ROCEPHIN) 2 gram injection 2 g Daily.      cholecalciferol, vitamin D3, (VITAMIN D3) 50 mcg (2,000 unit) Cap capsule Take 1 capsule by mouth daily.  Indications: vitamin supplement      DICLOFENAC SODIUM (VOLTAREN TOP) Apply 1 % topically as needed (for pain in knees).      ENTRESTO  mg per tablet Take 1 tablet by mouth 2 (two) times daily.      finasteride (PROSCAR) 5 mg tablet Take 5 mg by mouth once daily.      fluticasone propionate (FLONASE) 50 mcg/actuation nasal spray 2 sprays.      furosemide (LASIX) 80 MG tablet 80 mg.      gabapentin (NEURONTIN) 600 MG tablet 600 mg.      glimepiride (AMARYL) 2 MG tablet Take 2 mg by mouth once daily.      HYDROcodone-acetaminophen (NORCO) 7.5-325 mg per tablet Take 1 tablet by mouth 3 (three) times daily as needed.      ipratropium (ATROVENT) 0.02 % nebulizer solution Take 500 mcg by nebulization 4 (four) times daily. Rescue      ipratropium (ATROVENT) 21 mcg (0.03 %) nasal spray SMARTSIG:Both Nares      levothyroxine (SYNTHROID) 88 MCG tablet Take 88 mcg by mouth before breakfast.       LIDOcaine 5 % Crea   1 cleve, Topical, TID, # 45 gm, 5 Refill(s), [facility] Diabetic neuropathy, 178, cm, 08/01/23 13:02:00 CDT, Height/Length Measured, 113, kg, 07/31/23 12:14:00 CDT, Weight Dosing      metFORMIN (GLUCOPHAGE) 850 MG tablet   = 1 tab, Oral, BID, *ZYDUS*., # 180 tab, 3 Refill(s), Maintenance, Pharmacy: [facility], 179, cm, 05/03/21 12:19:00 CDT, Height/Length Measured, 117.8, kg, 05/03/21 12:19:00 CDT, Weight Dosing      methocarbamoL (ROBAXIN) 500 MG Tab 1,000 mg.      metoprolol succinate (TOPROL-XL) 25 MG 24 hr tablet = 1 tab, Oral, Daily, # 90 tab, 1 Refill(s), Maintenance, Pharmacy: [facility],  178, cm, 05/01/24 13:35:00 CDT, Height/Length Measured, 102.1, kg, 05/01/24 13:35:00 CDT, Weight Dosing      multivit-min-FA-lycopen-lutein (CENTRUM SILVER) 0.4 mg-300 mcg- 250 mcg Tab Take 1 tablet by mouth daily.  Indications: vitamin supplement      nitroGLYCERIN 0.4 MG/DOSE TL SPRY (NITROLINGUAL) 400 mcg/spray spray       omeprazole (PRILOSEC) 40 MG capsule = 1 cap, Oral, BID, # 180 cap, 3 Refill(s), Maintenance, Pharmacy: [facility], 178, cm, 08/22/24 11:31:00 CDT, Height/Length Measured, 106.9, kg, 08/22/24 11:31:00 CDT, Weight Dosing      ondansetron (ZOFRAN-ODT) 4 MG TbDL Take 4 mg by mouth every 6 (six) hours as needed.      OPW GABAPENTIN 5% LIDOCAINE HCL 5% TOPICAL CREAM 50G   1 cleve, Topical, TID, # 45 gm, 5 Refill(s), Pharmacy: [id number], Diabetic neuropathy, 175, cm, 02/01/23 13:05:00 CST, Height/Length Measured, 112.8, kg, 01/24/23 13:34:00 CST, Weight Dosing      potassium chloride SA (K-DUR,KLOR-CON) 20 MEQ tablet Take 20 mEq by mouth.      predniSONE (DELTASONE) 5 MG tablet Take by mouth.      ranibizumab (LUCENTIS) 0.3 mg/0.05 mL Soln Inject as directed.  Indications: macular degeneration/eye condition      ranolazine (RANEXA) 1,000 mg Tb12 Take 1,000 mg by mouth 2 (two) times daily.      sertraline (ZOLOFT) 100 MG tablet = 1 tab, Oral, Daily, # 90 tab, 3 Refill(s), Maintenance, Pharmacy: [facility], 178, cm, 05/01/24 13:35:00 CDT, Height/Length Measured, 102.1, kg, 05/01/24 13:35:00 CDT, Weight Dosing      silodosin (RAPAFLO) 8 mg Cap capsule Take 8 mg by mouth once daily.      silver sulfADIAZINE 1% (SILVADENE) 1 % cream Apply topically.      tiotropium-olodateroL (STIOLTO RESPIMAT) 2.5-2.5 mcg/actuation Mist = 2 inh, Inhale, every 24 hours, # 3 EA, 3 Refill(s), Maintenance, Pharmacy: [facility], Moderate COPD (chronic obstructive pulmonary disease), 178, cm, 06/19/24 14:49:00 CDT, Height/Length Measured, 103.6, kg, 06/19/24 14:49:00 CDT, Weight Dosing       "trazodone (DESYREL) 150 MG tablet Take 150 mg by mouth every evening.      vitamin E 400 UNIT capsule Take 1 capsule by mouth daily.  Indications: vitamin supplement      amoxicillin-clavulanate 875-125mg (AUGMENTIN) 875-125 mg per tablet Take 1 tablet by mouth every 12 (twelve) hours. (Patient not taking: Reported on 3/7/2025)      carbidopa-levodopa  mg (SINEMET)  mg per tablet 1 tab, Oral, HS, # 90 tab, 3 Refill(s), [facility], 178, cm, 24 13:35:00 CDT, Height/Length Measured, 102.1, kg, 24 13:35:00 CDT, Weight Dosing      linezolid (ZYVOX) 600 mg Tab SMARTSI.0 Tablet(s) By Mouth Twice Daily      methocarbamoL (ROBAXIN) 750 MG Tab Take 750 mg by mouth nightly as needed.      NUZYRA 150 mg Tab Take by mouth. (Patient not taking: Reported on 3/7/2025)      vit C,Y-Wt-fljny-lutein-zeaxan (PRESERVISION AREDS-2) 250-90-40-1 mg Cap Take 1 capsule by mouth twice daily.  Indications: vitamin supplement       No current facility-administered medications for this visit.     Review of patient's allergies indicates:   Allergen Reactions    Pregabalin      Other reaction(s): Depression    Flomax [tamsulosin]      Made extremity weak    Roflumilast     Sulfa (sulfonamide antibiotics)     Sulfa dyne     Sulfur      Old type not new types of sulfur    Latex Itching    Tegretol [carbamazepine] Itching and Rash       Review of Systems   Cardiovascular:  Positive for leg swelling.        Edema stable and improved, hold with open toed compression hose    Endocrine:        >10 yr DM2   Musculoskeletal:  Positive for arthralgias and gait problem.        Walker, limited mobility    All other systems reviewed and are negative.      Objective:      Vitals:    25 1049   Weight: 112.9 kg (248 lb 12.8 oz)   Height: 5' 10" (1.778 m)     Physical Exam  Vitals and nursing note reviewed.   Constitutional:       General: He is not in acute distress.     Appearance: Normal appearance. "   Cardiovascular:      Pulses:           Dorsalis pedis pulses are 1+ on the right side and 1+ on the left side.        Posterior tibial pulses are 1+ on the right side and 1+ on the left side.   Pulmonary:      Effort: Pulmonary effort is normal.   Musculoskeletal:      Right foot: Decreased range of motion (Arthritic and degenerative changes bilateral feet limited range of motion 1st MPJ and midfoot bilateral). Bunion present.      Left foot: Decreased range of motion.   Feet:      Right foot:      Protective Sensation: 6 sites tested.  3 sites sensed.      Skin integrity: Ulcer (Preulcerative hyperkeratotic tissue area lateral right foot lateral aspect styloid process without infection, no bruising, discoloration or skin opening) and callus present. No skin breakdown or erythema.      Toenail Condition: Right toenails are abnormally thick.      Left foot:      Protective Sensation: 6 sites tested.  3 sites sensed.      Skin integrity: No erythema (Healing abrasion lateral left ankle, dry).      Toenail Condition: Left toenails are abnormally thick.   Skin:     Capillary Refill: Capillary refill takes 2 to 3 seconds.      Comments: Pedal edema. Bandage for chronic venous insufficiency wound under right compression hose, clean, dry, left intact   Neurological:      Mental Status: He is alert.      Sensory: Sensory deficit present.      Comments: Diminished sensation digits, intact elsewhere bilateral feet neuropathy with sensation of numbness, no burning or tingling pain   Psychiatric:         Thought Content: Thought content normal.                                       Assessment:       1. Comprehensive diabetic foot examination, type 2 DM, encounter for    2. Type 2 diabetes, controlled, with neuropathy    3. Numbness of feet    4. Venous insufficiency of both lower extremities    5. Pre-ulcerative calluses - Right Foot            Plan:           Comprehensive diabetic pedal exam performed  Advised patient needs  to be cautious regarding reduced sensation in toes, it is intact elsewhere both feet  Had a lengthy discussion regarding neuropathy, lack of sensation, difficulty with balance and coordination are symptoms of neuropathy.  Advised not every patient experiences burning tingling pain, lack of feeling is secondary to neuropathy  Reviewed A1c, well-controlled at 6.1  Reviewed diabetic education  Reviewed benefit of controled glucose/diabetes regarding potential foot problems especially healing and to hopefully prevent any advancement of neuropathy.    Advised patient callus on the outside of the right foot is stable but overlying a bony area and is considered preulcerative callus at risk for complication  To recommended home health check this area every time they come out to change the bandage on his right leg  Preulcerative callus debrided, no skin opening redness or swelling at this time  Reviewed care and maintenance of skin, keep well hydrated on feet  Explained reduction and swelling feet and lower legs decreases the chance for complications  Continue open toed compression hose daily and elevation at home as much as possible  Reviewed appropriate shoes to be worn indoors to protect feet and for stability  Reviewed diabetic education pertaining to feet  Reviewed maintenance of skin and nails and potential complications.  Mild fungus both big toes with no complications.  Nails debrided with no other areas of concern at this time  Patient is at medium risk for complications due to lack of sensation in toes and chronic stasis ulcer right lower extremity  Reviewed need for daily foot checks and instructed patient to contact the office with any area of concern which has not improved in a few days   Reviewed daily foot checks and instructed to contact the office with any area of  concern which has not improved in a few days.  Patient was in understanding and agreement with treatment plan.  I counseled the patient on their  conditions, implications and medical management.  Instructed patient to contact the office with any changes, questions, concerns, worsening of symptoms.   Total face to face time 30 minutes, exam, assessment, treatment, discussion, additional time for review of chart prior to and following appointment and visit documentation, consultation and coordination of care.    Follow up 3 months      This note was created using [employer] voice recognition software that occasionally misinterpreted phrases or words.

## 2025-06-13 ENCOUNTER — OFFICE VISIT (OUTPATIENT)
Dept: PODIATRY | Facility: CLINIC | Age: 85
End: 2025-06-13
Payer: MEDICARE

## 2025-06-13 VITALS
BODY MASS INDEX: 33.67 KG/M2 | SYSTOLIC BLOOD PRESSURE: 115 MMHG | HEIGHT: 70 IN | HEART RATE: 61 BPM | RESPIRATION RATE: 18 BRPM | WEIGHT: 235.19 LBS | DIASTOLIC BLOOD PRESSURE: 56 MMHG

## 2025-06-13 DIAGNOSIS — I87.2 VENOUS INSUFFICIENCY OF BOTH LOWER EXTREMITIES: Primary | ICD-10-CM

## 2025-06-13 DIAGNOSIS — R20.0 NUMBNESS OF FEET: ICD-10-CM

## 2025-06-13 DIAGNOSIS — E11.40 TYPE 2 DIABETES, CONTROLLED, WITH NEUROPATHY: ICD-10-CM

## 2025-06-13 PROCEDURE — 99213 OFFICE O/P EST LOW 20 MIN: CPT | Mod: S$PBB,,, | Performed by: PODIATRIST

## 2025-06-13 PROCEDURE — 99215 OFFICE O/P EST HI 40 MIN: CPT | Mod: PBBFAC | Performed by: PODIATRIST

## 2025-06-13 PROCEDURE — 99999 PR PBB SHADOW E&M-EST. PATIENT-LVL V: CPT | Mod: PBBFAC,,, | Performed by: PODIATRIST

## 2025-06-13 RX ORDER — PREDNISONE 1 MG/1
TABLET ORAL
COMMUNITY
Start: 2025-05-14

## 2025-06-13 RX ORDER — ASPIRIN 81 MG/1
81 TABLET ORAL DAILY
COMMUNITY

## 2025-06-13 RX ORDER — CLOPIDOGREL 75 MG/1
TABLET, FILM COATED ORAL
COMMUNITY
Start: 2025-05-12

## 2025-06-13 RX ORDER — ISOSORBIDE MONONITRATE 30 MG/1
30 TABLET, EXTENDED RELEASE ORAL EVERY MORNING
COMMUNITY
Start: 2025-05-27

## 2025-06-13 RX ORDER — ASPIRIN 325 MG
325 TABLET ORAL
COMMUNITY
Start: 2025-04-28 | End: 2025-06-16 | Stop reason: ALTCHOICE

## 2025-06-13 RX ORDER — PANTOPRAZOLE SODIUM 40 MG/1
40 TABLET, DELAYED RELEASE ORAL
COMMUNITY
Start: 2025-05-28

## 2025-06-13 RX ORDER — ALLOPURINOL 300 MG/1
300 TABLET ORAL
COMMUNITY
Start: 2025-04-09

## 2025-06-13 RX ORDER — AMOXICILLIN AND CLAVULANATE POTASSIUM 500; 125 MG/1; 1/1
TABLET, FILM COATED ORAL
COMMUNITY
Start: 2025-06-02

## 2025-06-16 NOTE — PROGRESS NOTES
Subjective:       Patient ID: Angel Duque Jr. is a 84 y.o. male.    Chief Complaint: Follow-up, Callouses, Nail Problem, Numbness, and Diabetes Mellitus  Patient presents for follow-up due to diabetes, venous insufficiency, numbness in feet, nail problem  Patient relates chronic stasis wound on the front of the right lower leg has finally healed.  He has been instructed to continue to keep a bandage on it daily while he as his compression hose on to prevent irritation  He has been wearing open toed compression hose daily without any additional swelling over the last 3 months  Type 2 diabetic well-controlled A1c 6.1      Past Medical History:   Diagnosis Date    AAA (abdominal aortic aneurysm)     Arthritis     Rheumatoid & Osteoarthritis    BPH (benign prostatic hyperplasia)     CAD (coronary artery disease)     Chronic bronchitis     COPD (chronic obstructive pulmonary disease)     Depression     Diabetes mellitus, type 2     Diverticulosis     DM (diabetes mellitus)     Dyslipidemia     GERD (gastroesophageal reflux disease)     Gout     Hernia of unspecified site of abdominal cavity without mention of obstruction or gangrene     HTN (hypertension)     Hx of CABG     Hyperlipidemia     Hypothyroidism     Insomnia     Ischemic cardiomyopathy     Lower back pain     Lymphedema     Macular degeneration     MI (myocardial infarction)     HORTENSIA (obstructive sleep apnea)     Peripheral neuropathy     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     Tremor      Past Surgical History:   Procedure Laterality Date    ABDOMINAL AORTIC ANEURYSM REPAIR      ABDOMINAL HERNIA REPAIR      CARPAL TUNNEL RELEASE Right     CORONARY ARTERY BYPASS GRAFT      hemrhoidectomy      HERNIA REPAIR      NASAL SEPTUM SURGERY      TONSILLECTOMY      TOTAL KNEE ARTHROPLASTY      TOTAL KNEE ARTHROPLASTY Bilateral      Family History   Problem Relation Name Age of Onset    Esophageal cancer Paternal Uncle      Cancer Paternal Uncle   69        esophageal    Esophageal cancer Paternal Grandfather      Cancer Paternal Grandfather      Colon cancer Neg Hx       Social History     Socioeconomic History    Marital status:    Tobacco Use    Smoking status: Former     Current packs/day: 0.00     Types: Cigarettes     Quit date: 1997     Years since quittin.4    Smokeless tobacco: Never   Substance and Sexual Activity    Alcohol use: No    Drug use: No       Current Outpatient Medications   Medication Sig Dispense Refill    albuterol-ipratropium (DUO-NEB) 2.5 mg-0.5 mg/3 mL nebulizer solution Inhale by mouth if needed as directed.  Indications: COPD      allopurinoL (ZYLOPRIM) 300 MG tablet Take 300 mg by mouth.      ascorbic acid, vitamin C, (VITAMIN C) 1000 MG tablet Take 1 tablet by mouth daily.  Indications: vitamin supplement      aspirin (ECOTRIN) 81 MG EC tablet Take 81 mg by mouth once daily.      AUGMENTIN 500-125 mg Tab       azelastine (ASTELIN) 137 mcg (0.1 %) nasal spray 2 sprays.      butalbital-acetaminophen-caffeine -40 mg (ESGIC) -40 mg per tablet 2 tab, Oral, q6h, PRN for headache, # 120 tab, 0 Refill(s), Pharmacy: [facility], 178, cm, 24 10:38:00 CST, Height/Length Measured, 103.78, kg, 24 10:38:00 CST, Weight Dosing      calcium carb-mag oxide-vit D3 400-167-133 mg-mg-unit Tab Take 1 tablet by mouth daily or as directed.  Indications: supplement      cholecalciferol, vitamin D3, (VITAMIN D3) 50 mcg (2,000 unit) Cap capsule Take 1 capsule by mouth daily.  Indications: vitamin supplement      DICLOFENAC SODIUM (VOLTAREN TOP) Apply 1 % topically as needed (for pain in knees).      ENTRESTO  mg per tablet Take 1 tablet by mouth 2 (two) times daily.      finasteride (PROSCAR) 5 mg tablet Take 5 mg by mouth once daily.      fluticasone propionate (FLONASE) 50 mcg/actuation nasal spray 2 sprays.      furosemide (LASIX) 80 MG tablet 80 mg.      glimepiride (AMARYL) 2 MG tablet Take 2  mg by mouth once daily.      HYDROcodone-acetaminophen (NORCO) 7.5-325 mg per tablet Take 1 tablet by mouth 3 (three) times daily as needed.      ipratropium (ATROVENT) 0.02 % nebulizer solution Take 500 mcg by nebulization 4 (four) times daily. Rescue      ipratropium (ATROVENT) 21 mcg (0.03 %) nasal spray SMARTSIG:Both Nares      isosorbide mononitrate (IMDUR) 30 MG 24 hr tablet Take 30 mg by mouth every morning.      levothyroxine (SYNTHROID) 88 MCG tablet Take 88 mcg by mouth before breakfast.       LIDOcaine 5 % Crea   1 cleve, Topical, TID, # 45 gm, 5 Refill(s), Pharmacy: [facility], Diabetic neuropathy, 178, cm, 23 13:02:00 CDT, Height/Length Measured, 113, kg, 23 12:14:00 CDT, Weight Dosing      linezolid (ZYVOX) 600 mg Tab SMARTSI.0 Tablet(s) By Mouth Twice Daily      metFORMIN (GLUCOPHAGE) 850 MG tablet   = 1 tab, Oral, BID, *ZYDUS*., # 180 tab, 3 Refill(s), Maintenance, Pharmacy: [facility], 179, cm, 21 12:19:00 CDT, Height/Length Measured, 117.8, kg, 21 12:19:00 CDT, Weight Dosing      methocarbamoL (ROBAXIN) 750 MG Tab Take 750 mg by mouth nightly as needed.      metoprolol succinate (TOPROL-XL) 25 MG 24 hr tablet = 1 tab, Oral, Daily, # 90 tab, 1 Refill(s), Maintenance, Pharmacy: [facility], 178, cm, 24 13:35:00 CDT, Height/Length Measured, 102.1, kg, 24 13:35:00 CDT, Weight Dosing      multivit-min-FA-lycopen-lutein (CENTRUM SILVER) 0.4 mg-300 mcg- 250 mcg Tab Take 1 tablet by mouth daily.  Indications: vitamin supplement      ondansetron (ZOFRAN-ODT) 4 MG TbDL Take 4 mg by mouth every 6 (six) hours as needed.      pantoprazole (PROTONIX) 40 MG tablet Take 40 mg by mouth.      PLAVIX 75 mg tablet       potassium chloride SA (K-DUR,KLOR-CON) 20 MEQ tablet Take 20 mEq by mouth.      predniSONE (DELTASONE) 1 MG tablet Take by mouth.      ranibizumab (LUCENTIS) 0.3 mg/0.05 mL Soln Inject as directed.  Indications:  macular degeneration/eye condition      ranolazine (RANEXA) 1,000 mg Tb12 Take 1,000 mg by mouth 2 (two) times daily.      silodosin (RAPAFLO) 8 mg Cap capsule Take 8 mg by mouth once daily.      trazodone (DESYREL) 150 MG tablet Take 150 mg by mouth every evening.      vit C,C-Tf-jxsck-lutein-zeaxan (PRESERVISION AREDS-2) 250-90-40-1 mg Cap Take 1 capsule by mouth twice daily.  Indications: vitamin supplement      vitamin E 400 UNIT capsule Take 1 capsule by mouth daily.  Indications: vitamin supplement      amoxicillin-clavulanate 1,000-62.5 mg (AUGMENTIN XR) 1,000-62.5 mg per tablet Take 2 tablets by mouth 2 (two) times daily. (Patient not taking: Reported on 6/13/2025)      amoxicillin-clavulanate 875-125mg (AUGMENTIN) 875-125 mg per tablet Take 1 tablet by mouth every 12 (twelve) hours. (Patient not taking: Reported on 6/13/2025)      aspirin 325 MG tablet Take 325 mg by mouth. (Patient not taking: Reported on 6/13/2025)      atorvastatin (LIPITOR) 80 MG tablet = 1 tab, Oral, Daily, # 90 tab, 3 Refill(s), Maintenance, Pharmacy: [facility], 178, cm, 04/01/24 13:33:00 CDT, Height/Length Measured, 102, kg, 04/01/24 13:33:00 CDT, Weight Dosing      carbidopa-levodopa  mg (SINEMET)  mg per tablet 1 tab, Oral, HS, # 90 tab, 3 Refill(s), Pharmacy: [facility], 178, cm, 05/01/24 13:35:00 CDT, Height/Length Measured, 102.1, kg, 05/01/24 13:35:00 CDT, Weight Dosing      cefTRIAXone (ROCEPHIN) 2 gram injection 2 g Daily. (Patient not taking: Reported on 6/13/2025)      gabapentin (NEURONTIN) 600 MG tablet 600 mg.      nitroGLYCERIN 0.4 MG/DOSE TL SPRY (NITROLINGUAL) 400 mcg/spray spray  (Patient not taking: Reported on 6/13/2025)      NUZYRA 150 mg Tab Take by mouth. (Patient not taking: Reported on 6/13/2025)      omeprazole (PRILOSEC) 40 MG capsule = 1 cap, Oral, BID, # 180 cap, 3 Refill(s), Maintenance, Pharmacy: [facility], 178, cm, 08/22/24 11:31:00 CDT, Height/Length  Measured, 106.9, kg, 08/22/24 11:31:00 CDT, Weight Dosing (Patient not taking: Reported on 6/13/2025)      OPW GABAPENTIN 5% LIDOCAINE HCL 5% TOPICAL CREAM 50G   1 cleve, Topical, TID, # 45 gm, 5 Refill(s), Pharmacy: [id number], Diabetic neuropathy, 175, cm, 02/01/23 13:05:00 CST, Height/Length Measured, 112.8, kg, 01/24/23 13:34:00 CST, Weight Dosing (Patient not taking: Reported on 6/13/2025)      predniSONE (DELTASONE) 5 MG tablet Take by mouth. (Patient not taking: Reported on 6/13/2025)      sertraline (ZOLOFT) 100 MG tablet = 1 tab, Oral, Daily, # 90 tab, 3 Refill(s), [facility], cm, 05/01/24 13:35:00 CDT, Height/Length Measured, 102.1, kg, 05/01/24 13:35:00 CDT, Weight Dosing      silver sulfADIAZINE 1% (SILVADENE) 1 % cream Apply topically. (Patient not taking: Reported on 6/13/2025)      tiotropium-olodateroL (STIOLTO RESPIMAT) 2.5-2.5 mcg/actuation Mist = 2 inh, Inhale, every 24 hours, # 3 EA, 3 Refill(s), Maintenance, Pharmacy: [facility], Moderate COPD (chronic obstructive pulmonary disease), 178, cm, 06/19/24 14:49:00 CDT, Height/Length Measured, 103.6, kg, 06/19/24 14:49:00 CDT, Weight Dosing (Patient not taking: Reported on 6/13/2025)       No current facility-administered medications for this visit.     Review of patient's allergies indicates:   Allergen Reactions    Pregabalin      Other reaction(s): Depression    Flomax [tamsulosin]      Made extremity weak    Roflumilast     Sulfa (sulfonamide antibiotics)     Sulfa dyne     Sulfur      Old type not new types of sulfur    Latex Itching    Tegretol [carbamazepine] Itching and Rash       Review of Systems   Cardiovascular:  Positive for leg swelling.        Edema stable and improved, open toed compression hose    Endocrine:        >10 yr DM2   Musculoskeletal:  Positive for arthralgias and gait problem.        Walker, limited mobility    All other systems reviewed and are negative.      Objective:  "     Vitals:    06/13/25 1041   BP: (!) 115/56   Pulse: 61   Resp: 18   Weight: 106.7 kg (235 lb 3.2 oz)   Height: 5' 10" (1.778 m)     Physical Exam  Vitals and nursing note reviewed.   Cardiovascular:      Pulses:           Dorsalis pedis pulses are 1+ on the right side and 1+ on the left side.        Posterior tibial pulses are 1+ on the right side and 1+ on the left side.   Musculoskeletal:      Right foot: Decreased range of motion (Arthritic and degenerative changes bilateral feet limited range of motion 1st MPJ and midfoot bilateral). Bunion present.      Left foot: Decreased range of motion.   Feet:      Right foot:      Skin integrity: No skin breakdown.      Toenail Condition: Right toenails are abnormally thick.      Left foot:      Skin integrity: No skin breakdown.      Toenail Condition: Left toenails are abnormally thick.   Skin:     Capillary Refill: Capillary refill takes 2 to 3 seconds.      Comments: Pedal edema. Bandage for chronic venous insufficiency wound under right compression hose, clean, dry, left intact   Neurological:      Sensory: Sensory deficit present.   Psychiatric:         Thought Content: Thought content normal.                             Assessment:       1. Venous insufficiency of both lower extremities    2. Type 2 diabetes, controlled, with neuropathy    3. Numbness of feet              Plan:           Reviewed diabetic education  Reviewed care and maintenance of skin, keep well hydrated on feet  Swelling much improved  Continue open toed compression hose daily and elevation at home as much as possible  Reviewed appropriate shoes to be worn indoors to protect feet and for stability  Reviewed diabetic education pertaining to feet  Reviewed care and maintenance of nails, fungal involvement, topical treatment, Nails debrided in thickness reduced with no areas of complication at this time  Reviewed daily foot checks and instructed to contact the office with any area of  concern " which has not improved in a few days.  Patient was in understanding and agreement with treatment plan.  I counseled the patient on their conditions, implications and medical management.  Instructed patient to contact the office with any changes, questions, concerns, worsening of symptoms.   Total face to face time 20 minutes, exam, assessment, treatment, discussion, additional time for review of chart prior to and following appointment and visit documentation, consultation and coordination of care.    Follow up 3 months      This note was created using M*Modal voice recognition software that occasionally misinterpreted phrases or words.